# Patient Record
Sex: FEMALE | Race: WHITE | NOT HISPANIC OR LATINO | Employment: UNEMPLOYED | URBAN - METROPOLITAN AREA
[De-identification: names, ages, dates, MRNs, and addresses within clinical notes are randomized per-mention and may not be internally consistent; named-entity substitution may affect disease eponyms.]

---

## 2017-02-07 ENCOUNTER — HOSPITAL ENCOUNTER (OUTPATIENT)
Dept: CARDIOLOGY | Facility: HOSPITAL | Age: 52
Setting detail: RECURRING SERIES
Discharge: HOME | End: 2017-02-07

## 2017-02-07 ENCOUNTER — CLINICAL SUPPORT NO REQUIREMENTS (OUTPATIENT)
Dept: CARDIOLOGY | Facility: CLINIC | Age: 52
End: 2017-02-07

## 2017-02-07 DIAGNOSIS — I49.5 SICK SINUS SYNDROME (HCC): Primary | ICD-10-CM

## 2017-02-07 DIAGNOSIS — I44.2 COMPLETE HEART BLOCK (HCC): ICD-10-CM

## 2017-02-07 PROCEDURE — 36416 COLLJ CAPILLARY BLOOD SPEC: CPT

## 2017-02-07 PROCEDURE — 93288 INTERROG EVL PM/LDLS PM IP: CPT | Performed by: INTERNAL MEDICINE

## 2017-02-07 PROCEDURE — 85610 PROTHROMBIN TIME: CPT

## 2017-02-13 ENCOUNTER — HOSPITAL ENCOUNTER (OUTPATIENT)
Dept: CARDIOLOGY | Facility: HOSPITAL | Age: 52
Setting detail: RECURRING SERIES
Discharge: HOME | End: 2017-02-13

## 2017-02-13 PROCEDURE — 85610 PROTHROMBIN TIME: CPT

## 2017-02-13 PROCEDURE — 36416 COLLJ CAPILLARY BLOOD SPEC: CPT

## 2017-03-16 ENCOUNTER — TELEPHONE (OUTPATIENT)
Dept: CARDIOLOGY | Facility: CLINIC | Age: 52
End: 2017-03-16

## 2017-03-16 ENCOUNTER — HOSPITAL ENCOUNTER (OUTPATIENT)
Dept: CARDIOLOGY | Facility: HOSPITAL | Age: 52
Setting detail: RECURRING SERIES
Discharge: HOME | End: 2017-03-16

## 2017-03-16 PROCEDURE — 85610 PROTHROMBIN TIME: CPT

## 2017-03-16 PROCEDURE — 36416 COLLJ CAPILLARY BLOOD SPEC: CPT

## 2017-03-16 NOTE — TELEPHONE ENCOUNTER
----- Message from Jorden Stockton MD sent at 3/9/2017  4:40 PM EST -----  Regarding: RE:  pt  Give her to [facility] when she joins   ----- Message -----     From: Vidya Hood MA     Sent: 3/9/2017   2:16 PM       To: Jorden Stockton MD  Subject:  pt                                        This pt came to  in November from another cardiologists in [place]. She has a pacemaker. She gets her INR checked here. She has not seen anyone else here. Do you want me to set her up with another provider or do you want to take over here care?......Vidya

## 2017-03-16 NOTE — TELEPHONE ENCOUNTER
[facility] PT------- Marlin called from [facility] because pt is wanting to get a radiation treatment that involves her going into a hyperbaric oxygen tank. They are wanting to know if it is ok since she has a pacemaker. Please advise.....Vidya

## 2017-03-30 ENCOUNTER — TELEPHONE (OUTPATIENT)
Dept: CARDIOLOGY | Facility: CLINIC | Age: 52
End: 2017-03-30

## 2017-03-30 NOTE — TELEPHONE ENCOUNTER
----- Message from Samuel Hood MA sent at 3/27/2017 12:59 PM EDT -----  Regarding: APPT WITH BRUNO  Pt needs a ppt with bruno to get established with another provider before she needs refills. Thanks......samuel

## 2017-03-30 NOTE — TELEPHONE ENCOUNTER
"Called pt to schedule with [facility] next week so she could get her refills.  Pt is very adamant that she will see not see anyone other than a MD.  She is aware Dr Stockton's next appointment is several months out and she states that will not work for her.  I did offer her an appointment to see another MD in the office sooner and she said \"No, you all can't help me\"      As of now, the patient is not scheduled.    Thank you  Abril MESA"

## 2017-03-31 NOTE — TELEPHONE ENCOUNTER
Dr. Stockton what would you like me to do about this. She will need refills on Warfarin soon and she only saw  here and has no other cardiologists outside our office.....Vidya

## 2017-03-31 NOTE — TELEPHONE ENCOUNTER
We can refill her warfarin for another month,  But the deal is we have APRN's here and that is just the way it is.  I will see her w the APRN  Or she can get another doc

## 2018-02-05 ENCOUNTER — TRANSCRIBE ORDERS (OUTPATIENT)
Dept: ADMINISTRATIVE | Facility: HOSPITAL | Age: 53
End: 2018-02-05

## 2018-02-05 DIAGNOSIS — Z00.00 ENCOUNTER FOR PREVENTATIVE ADULT HEALTH CARE EXAMINATION: Primary | ICD-10-CM

## 2018-02-22 ENCOUNTER — HOSPITAL ENCOUNTER (OUTPATIENT)
Dept: MAMMOGRAPHY | Facility: HOSPITAL | Age: 53
Discharge: HOME | End: 2018-02-22
Admitting: FAMILY MEDICINE

## 2018-02-22 DIAGNOSIS — Z00.00 ENCOUNTER FOR PREVENTATIVE ADULT HEALTH CARE EXAMINATION: ICD-10-CM

## 2018-02-22 PROCEDURE — 77067 SCR MAMMO BI INCL CAD: CPT

## 2018-06-18 ENCOUNTER — CONSULT (OUTPATIENT)
Dept: ONCOLOGY | Facility: CLINIC | Age: 53
End: 2018-06-18

## 2018-06-18 ENCOUNTER — LAB (OUTPATIENT)
Dept: OTHER | Facility: HOSPITAL | Age: 53
End: 2018-06-18

## 2018-06-18 VITALS
TEMPERATURE: 97.8 F | DIASTOLIC BLOOD PRESSURE: 71 MMHG | HEART RATE: 63 BPM | BODY MASS INDEX: 22.29 KG/M2 | WEIGHT: 142 LBS | OXYGEN SATURATION: 100 % | SYSTOLIC BLOOD PRESSURE: 109 MMHG | HEIGHT: 67 IN | RESPIRATION RATE: 12 BRPM

## 2018-06-18 DIAGNOSIS — D68.51 FACTOR V LEIDEN (HCC): Primary | ICD-10-CM

## 2018-06-18 DIAGNOSIS — Z79.01 CHRONIC ANTICOAGULATION: ICD-10-CM

## 2018-06-18 DIAGNOSIS — Z86.718 HISTORY OF DEEP VEIN THROMBOSIS (DVT) OF LOWER EXTREMITY: ICD-10-CM

## 2018-06-18 DIAGNOSIS — Z86.711 HISTORY OF PULMONARY EMBOLISM: ICD-10-CM

## 2018-06-18 LAB
BASOPHILS # BLD AUTO: 0.08 10*3/MM3 (ref 0–0.2)
BASOPHILS NFR BLD AUTO: 1 % (ref 0–1.5)
DEPRECATED RDW RBC AUTO: 45.5 FL (ref 37–54)
EOSINOPHIL # BLD AUTO: 0.23 10*3/MM3 (ref 0–0.7)
EOSINOPHIL NFR BLD AUTO: 3 % (ref 0.3–6.2)
ERYTHROCYTE [DISTWIDTH] IN BLOOD BY AUTOMATED COUNT: 12.6 % (ref 11.7–13)
HCT VFR BLD AUTO: 39.8 % (ref 35.6–45.5)
HGB BLD-MCNC: 13.6 G/DL (ref 11.9–15.5)
IMM GRANULOCYTES # BLD: 0.04 10*3/MM3 (ref 0–0.03)
IMM GRANULOCYTES NFR BLD: 0.5 % (ref 0–0.5)
LYMPHOCYTES # BLD AUTO: 2.21 10*3/MM3 (ref 0.9–4.8)
LYMPHOCYTES NFR BLD AUTO: 28.5 % (ref 19.6–45.3)
MCH RBC QN AUTO: 33.3 PG (ref 26.9–32)
MCHC RBC AUTO-ENTMCNC: 34.2 G/DL (ref 32.4–36.3)
MCV RBC AUTO: 97.3 FL (ref 80.5–98.2)
MONOCYTES # BLD AUTO: 0.65 10*3/MM3 (ref 0.2–1.2)
MONOCYTES NFR BLD AUTO: 8.4 % (ref 5–12)
NEUTROPHILS # BLD AUTO: 4.55 10*3/MM3 (ref 1.9–8.1)
NEUTROPHILS NFR BLD AUTO: 58.6 % (ref 42.7–76)
NRBC BLD MANUAL-RTO: 0 /100 WBC (ref 0–0)
PLATELET # BLD AUTO: 200 10*3/MM3 (ref 140–500)
PMV BLD AUTO: 10 FL (ref 6–12)
RBC # BLD AUTO: 4.09 10*6/MM3 (ref 3.9–5.2)
WBC NRBC COR # BLD: 7.76 10*3/MM3 (ref 4.5–10.7)

## 2018-06-18 PROCEDURE — 36415 COLL VENOUS BLD VENIPUNCTURE: CPT

## 2018-06-18 PROCEDURE — 85025 COMPLETE CBC W/AUTO DIFF WBC: CPT | Performed by: INTERNAL MEDICINE

## 2018-06-18 PROCEDURE — 99205 OFFICE O/P NEW HI 60 MIN: CPT | Performed by: INTERNAL MEDICINE

## 2018-06-18 NOTE — PROGRESS NOTES
Subjective     REASON FOR CONSULTATION:  Provide an opinion on any further workup or treatment on:    · Factor V Leiden mutation  · History of Pulmonary embolism in 1998  · History of DVT and pulmonary embolism in 2005                       REQUESTING PHYSICIAN: Peter Jones MD      RECORDS OBTAINED: Records of the patients history including those obtained from the referring provider were reviewed and summarized in detail.    HISTORY OF PRESENT ILLNESS:      Karie Potter is a 52 y.o. patient who was referred for evaluation of anticoagulation options with ongoing treatment for HIV/AIDS.  Patient was diagnosed with pulmonary embolism in 1998  She was found to have homozygous factor V Leiden mutation.  She was placed on anticoagulation with Coumadin. She stayed on anticoagulation until 2005 when she stopped taking the medicine on her own. Shortly after, she developed right lower extremity DVT and 2 pulmonary emboli.  The patient was placed back on Coumadin and stayed on this medicine.    Patient is now following up with Dr. Acosta for her HIV/AIDS. She was recently switched to Genvoya. There is an interaction between this medicine and Coumadin.  Dr. Acosta commended evaluation by hematology for assistance with anticoagulation given that the patient needs ongoing anticoagulation.    Patient reports feeling cold as a result of Coumadin.  She has easy bruisability.  No bleeding at present.             Past Medical History:   Diagnosis Date   • Abnormal blood chemistry    • Allergic rhinitis    • Blood clotting disorder    • Bronchitis    • CAD (coronary artery disease)    • CHF (congestive heart failure)    • Colon cancer    • Contusion of left shoulder    • COPD (chronic obstructive pulmonary disease)    • Crohn's disease    • Depression    • Dyspareunia, female    • Fracture of pubic ramus    • Hip fracture    • HIV disease    • Hypothyroidism    • IBS (irritable bowel syndrome)    • Lumbar strain    • Migraine     • Peripheral neuropathy    • Pilonidal cyst    • Rectal cancer    • STD (female)        Past Surgical History:   Procedure Laterality Date   • LUNG SURGERY     • PACEMAKER IMPLANTATION     • PILONIDAL CYSTECTOMY     • RECTAL SURGERY      destruction of rectal tumor   • THYROIDECTOMY, PARTIAL     • TONSILLECTOMY         Social History     Social History   • Marital status:      Spouse name: N/A   • Number of children: N/A   • Years of education: N/A     Occupational History   • Not on file.     Social History Main Topics   • Smoking status: Current Every Day Smoker   • Smokeless tobacco: Not on file   • Alcohol use Yes   • Drug use: Unknown   • Sexual activity: Not on file     Other Topics Concern   • Not on file     Social History Narrative   • No narrative on file       Cancer-related family history is not on file.    MEDICATIONS:    Current Outpatient Prescriptions:   •  buPROPion XL (WELLBUTRIN XL) 300 MG 24 hr tablet, Take 300 mg by mouth Daily., Disp: , Rfl:   •  Kwyzcmp-Ecfln-Fzhonqdc-TenofAF (GENVOYA) [id number] MG tablet, Take 1 capsule by mouth Daily., Disp: , Rfl:   •  gabapentin (NEURONTIN) 300 MG capsule, Take 300 mg by mouth 2 (Two) Times a Day., Disp: , Rfl:   •  IRON PO, Take 65 mg by mouth Daily., Disp: , Rfl:   •  Multiple Vitamin (MULTI-VITAMIN PO), Take  by mouth Daily., Disp: , Rfl:   •  Multiple Vitamins-Minerals (ZINC PO), Take 50 mg by mouth Daily., Disp: , Rfl:   •  Probiotic Product (PROBIOTIC DAILY PO), Take  by mouth., Disp: , Rfl:   •  topiramate (TOPAMAX) 50 MG tablet, Take 50 mg by mouth 2 (Two) Times a Day., Disp: , Rfl:   •  warfarin (COUMADIN) 5 MG tablet, Take 5 mg by mouth Daily., Disp: , Rfl:      ALLERGIES:  Allergies   Allergen Reactions   • Azithromycin    • Penicillins         REVIEW OF SYSTEMS:  GENERAL:  Feels cold. No weight change.  Fatigue.   SKIN:  No skin rashes or lesions. Itching.  HEME/LYMPH: No Anemia. Easy bruisability.   EYES:  Poor vision.  ENT:   "Sore throat. decreased hearing.  RESPIRATORY:  No Shortness of breath. No Cough. No Hemoptysis.   CVS:  No Chest pain. Ankle swelling. No Palpitations.   GI:  No Abdominal pain. No Nausea. No Vomiting. No Constipation. Diarrhea. No Melena. No Hematochezia.  :  No Hematuria. No Dysuria. Increased frequency.   MUSCULOSKELETAL:  Back pain.   NEUROLOGICAL:  Headaches. Lower extremity Numbness to the level above the knees.      Objective   VITAL SIGNS:  Vitals:    06/18/18 1512   BP: 109/71   Pulse: 63   Resp: 12   Temp: 97.8 °F (36.6 °C)   TempSrc: Oral   SpO2: 100%   Weight: 64.4 kg (142 lb)   Height: 169 cm (66.54\")  Comment: new pt   PainSc:   9   PainLoc: Head       Wt Readings from Last 3 Encounters:   06/18/18 64.4 kg (142 lb)   11/10/16 68 kg (150 lb)   11/01/16 68 kg (150 lb)       PHYSICAL EXAMINATION  GENERAL:  The patient appears in good general condition, not in acute distress.  SKIN:  No skin rashes or lesions. Ecchymosis over the upper and lower extremities.  HEAD:  Normocephalic.  EYES:  No Pallor. No Jaundice.   NECK:  Supple with no Thyromegaly or Masses.  LYMPHATICS:  No cervical or supraclavicular lymphadenopathy.  CHEST:  Lungs clear to auscultation bilaterally. No added sounds.  CARDIAC:  Normal S1 & S2. No Murmurs.  ABDOMEN:  Soft. No tenderness. No Hepatomegaly. No Splenomegaly. No masses.  EXTREMITIES:  No Edema. No Calf tenderness. No Cyanosis.   NEUROLOGICAL:  No Focal neurological deficits.       RESULT REVIEW:     Results from last 7 days  Lab Units 06/18/18  1451   WBC 10*3/mm3 7.76   NEUTROS ABS 10*3/mm3 4.55   HEMOGLOBIN g/dL 13.6   HEMATOCRIT % 39.8   PLATELETS 10*3/mm3 200             Assessment/Plan   1.  Factor V Leiden homozygous mutation. Patient has history of pulmonary embolism around 1998. She Was placed on Coumadin and took it for several years.  However,she stopped anticoagulation on her own I'm developed right lower extremity DVT and 2 pulmonary emboli shortly after coming " off of Coumadin.    2.  The patient has HIV/AIDS and needs ongoing therapy  She was placed on Genvoya which has interactions with Coumadin and it is not recommended for patients to take Coumadin while taking this medicine.    I recommended switching to one of the new direct oral anticoagulants.  When I reviewed interactions between them and Genvoya, There is a significant interaction with Xarelto and it is not recommended to take The 2 medicines together.  Eliquis can be taken but with very close monitoring.  The interaction may result in increased effectiveness of Eliquis.    I had a lengthy discussion with the patient.  She is in need of switching to a new direct oral anticoagulant. I recommended Eliquis at 2.5 mg twice a day.  The discussed the side effects including positivity of bleeding which can be from the gastrointestinal site. The patient wishes to proceed.    PLAN:    1.  Stop Coumadin.  The last dose the patient took was on 6/17/18.    2.  I prescribed Eliquis 2.5 mg every 12 hours to be started on 6/20/2018.    I will see the patient in follow up in 1 week with PT/INR and PTT.         Matthew Frazier MD  06/18/18

## 2018-06-20 ENCOUNTER — TELEPHONE (OUTPATIENT)
Dept: ONCOLOGY | Facility: CLINIC | Age: 53
End: 2018-06-20

## 2018-06-20 NOTE — TELEPHONE ENCOUNTER
1:00pm--received a message to call pt.  Attempted to call pt.  L/m on pt v/m at 1:00pm to call office.

## 2018-06-20 NOTE — TELEPHONE ENCOUNTER
"Pt. At first thought the medication was going to be too expensive, but she found out it was only going to be $20.00.  States \"no worries.\"  "

## 2018-06-23 ENCOUNTER — DOCUMENTATION (OUTPATIENT)
Dept: ONCOLOGY | Facility: CLINIC | Age: 53
End: 2018-06-23

## 2018-06-23 NOTE — PROGRESS NOTES
Patient called reporting a large amount of liquid stool that was dark brown to black.  She did not see any bright red blood.  We told her to hold her Eliquis for now.  She currently denies any weakness or dizziness.  We encouraged her to go to the emergency room to be evaluated but she declined.  She said that if this happens again or if she does start feeling weak or dizzy that she will go the emergency room.

## 2018-06-27 ENCOUNTER — APPOINTMENT (OUTPATIENT)
Dept: OTHER | Facility: HOSPITAL | Age: 53
End: 2018-06-27

## 2018-07-09 ENCOUNTER — APPOINTMENT (OUTPATIENT)
Dept: LAB | Facility: HOSPITAL | Age: 53
End: 2018-07-09

## 2018-07-09 ENCOUNTER — APPOINTMENT (OUTPATIENT)
Dept: ONCOLOGY | Facility: CLINIC | Age: 53
End: 2018-07-09

## 2019-02-13 ENCOUNTER — APPOINTMENT (OUTPATIENT)
Dept: CT IMAGING | Facility: HOSPITAL | Age: 54
End: 2019-02-13

## 2019-02-13 ENCOUNTER — HOSPITAL ENCOUNTER (OUTPATIENT)
Facility: HOSPITAL | Age: 54
Setting detail: OBSERVATION
Discharge: HOME | End: 2019-02-14
Attending: EMERGENCY MEDICINE | Admitting: INTERNAL MEDICINE

## 2019-02-13 ENCOUNTER — APPOINTMENT (OUTPATIENT)
Dept: GENERAL RADIOLOGY | Facility: HOSPITAL | Age: 54
End: 2019-02-13

## 2019-02-13 VITALS
DIASTOLIC BLOOD PRESSURE: 57 MMHG | RESPIRATION RATE: 18 BRPM | TEMPERATURE: 98.2 F | HEIGHT: 66 IN | BODY MASS INDEX: 21.38 KG/M2 | HEART RATE: 60 BPM | WEIGHT: 133 LBS | SYSTOLIC BLOOD PRESSURE: 108 MMHG | OXYGEN SATURATION: 98 %

## 2019-02-13 DIAGNOSIS — R09.02 HYPOXIA: ICD-10-CM

## 2019-02-13 DIAGNOSIS — J18.9 ATYPICAL PNEUMONIA: ICD-10-CM

## 2019-02-13 DIAGNOSIS — J20.9 BRONCHITIS WITH BRONCHOSPASM: Primary | ICD-10-CM

## 2019-02-13 PROBLEM — J44.9 COPD (CHRONIC OBSTRUCTIVE PULMONARY DISEASE) (HCC): Status: ACTIVE | Noted: 2019-02-13

## 2019-02-13 PROBLEM — B20 HIV DISEASE (HCC): Status: ACTIVE | Noted: 2019-02-13

## 2019-02-13 PROBLEM — E03.9 HYPOTHYROIDISM: Status: ACTIVE | Noted: 2019-02-13

## 2019-02-13 LAB
ALBUMIN SERPL-MCNC: 4 G/DL (ref 3.5–5.2)
ALBUMIN/GLOB SERPL: 1.2 G/DL
ALP SERPL-CCNC: 62 U/L (ref 39–117)
ALT SERPL W P-5'-P-CCNC: 34 U/L (ref 1–33)
ANION GAP SERPL CALCULATED.3IONS-SCNC: 9.9 MMOL/L
AST SERPL-CCNC: 30 U/L (ref 1–32)
BILIRUB SERPL-MCNC: 0.3 MG/DL (ref 0.1–1.2)
BUN BLD-MCNC: 15 MG/DL (ref 6–20)
BUN/CREAT SERPL: 12.3 (ref 7–25)
CALCIUM SPEC-SCNC: 12 MG/DL (ref 8.6–10.5)
CHLORIDE SERPL-SCNC: 106 MMOL/L (ref 98–107)
CO2 SERPL-SCNC: 27.1 MMOL/L (ref 22–29)
CREAT BLD-MCNC: 1.22 MG/DL (ref 0.57–1)
D DIMER PPP FEU-MCNC: 0.47 MCGFEU/ML (ref 0–0.49)
DEPRECATED RDW RBC AUTO: 45 FL (ref 37–54)
EOSINOPHIL # BLD MANUAL: 0.4 10*3/MM3 (ref 0–0.4)
EOSINOPHIL NFR BLD MANUAL: 5 % (ref 0.3–6.2)
ERYTHROCYTE [DISTWIDTH] IN BLOOD BY AUTOMATED COUNT: 11.9 % (ref 12.3–15.4)
FLUAV AG NPH QL: NEGATIVE
FLUBV AG NPH QL IA: NEGATIVE
GFR SERPL CREATININE-BSD FRML MDRD: 46 ML/MIN/1.73
GLOBULIN UR ELPH-MCNC: 3.3 GM/DL
GLUCOSE BLD-MCNC: 106 MG/DL (ref 65–99)
HCT VFR BLD AUTO: 38.8 % (ref 34–46.6)
HGB BLD-MCNC: 13.1 G/DL (ref 12–15.9)
INR PPP: 2.38 (ref 0.9–1.1)
LYMPHOCYTES # BLD MANUAL: 2.31 10*3/MM3 (ref 0.7–3.1)
LYMPHOCYTES NFR BLD MANUAL: 29 % (ref 19.6–45.3)
LYMPHOCYTES NFR BLD MANUAL: 5 % (ref 5–12)
MCH RBC QN AUTO: 34.7 PG (ref 26.6–33)
MCHC RBC AUTO-ENTMCNC: 33.8 G/DL (ref 31.5–35.7)
MCV RBC AUTO: 102.6 FL (ref 79–97)
MONOCYTES # BLD AUTO: 0.4 10*3/MM3 (ref 0.1–0.9)
NEUTROPHILS # BLD AUTO: 4.85 10*3/MM3 (ref 1.4–7)
NEUTROPHILS NFR BLD MANUAL: 61 % (ref 42.7–76)
NT-PROBNP SERPL-MCNC: 331.2 PG/ML (ref 5–900)
PLAT MORPH BLD: NORMAL
PLATELET # BLD AUTO: 194 10*3/MM3 (ref 140–450)
PMV BLD AUTO: 9.9 FL (ref 6–12)
POTASSIUM BLD-SCNC: 4 MMOL/L (ref 3.5–5.2)
PROCALCITONIN SERPL-MCNC: 0.12 NG/ML (ref 0.1–0.25)
PROT SERPL-MCNC: 7.3 G/DL (ref 6–8.5)
PROTHROMBIN TIME: 25.6 SECONDS (ref 11.7–14.2)
RBC # BLD AUTO: 3.78 10*6/MM3 (ref 3.77–5.28)
RBC MORPH BLD: NORMAL
SODIUM BLD-SCNC: 143 MMOL/L (ref 136–145)
TROPONIN T SERPL-MCNC: <0.01 NG/ML (ref 0–0.03)
WBC MORPH BLD: NORMAL
WBC NRBC COR # BLD: 7.95 10*3/MM3 (ref 3.4–10.8)

## 2019-02-13 PROCEDURE — 87581 M.PNEUMON DNA AMP PROBE: CPT | Performed by: INTERNAL MEDICINE

## 2019-02-13 PROCEDURE — 93010 ELECTROCARDIOGRAM REPORT: CPT | Performed by: INTERNAL MEDICINE

## 2019-02-13 PROCEDURE — 84145 PROCALCITONIN (PCT): CPT | Performed by: EMERGENCY MEDICINE

## 2019-02-13 PROCEDURE — G0378 HOSPITAL OBSERVATION PER HR: HCPCS

## 2019-02-13 PROCEDURE — 85007 BL SMEAR W/DIFF WBC COUNT: CPT | Performed by: EMERGENCY MEDICINE

## 2019-02-13 PROCEDURE — 94640 AIRWAY INHALATION TREATMENT: CPT

## 2019-02-13 PROCEDURE — 25010000002 CEFTRIAXONE PER 250 MG: Performed by: EMERGENCY MEDICINE

## 2019-02-13 PROCEDURE — 85379 FIBRIN DEGRADATION QUANT: CPT | Performed by: EMERGENCY MEDICINE

## 2019-02-13 PROCEDURE — 87804 INFLUENZA ASSAY W/OPTIC: CPT | Performed by: EMERGENCY MEDICINE

## 2019-02-13 PROCEDURE — 0 IOPAMIDOL PER 1 ML: Performed by: EMERGENCY MEDICINE

## 2019-02-13 PROCEDURE — 36415 COLL VENOUS BLD VENIPUNCTURE: CPT | Performed by: EMERGENCY MEDICINE

## 2019-02-13 PROCEDURE — 93005 ELECTROCARDIOGRAM TRACING: CPT

## 2019-02-13 PROCEDURE — 80053 COMPREHEN METABOLIC PANEL: CPT | Performed by: EMERGENCY MEDICINE

## 2019-02-13 PROCEDURE — 83880 ASSAY OF NATRIURETIC PEPTIDE: CPT | Performed by: EMERGENCY MEDICINE

## 2019-02-13 PROCEDURE — 94799 UNLISTED PULMONARY SVC/PX: CPT

## 2019-02-13 PROCEDURE — 87040 BLOOD CULTURE FOR BACTERIA: CPT | Performed by: EMERGENCY MEDICINE

## 2019-02-13 PROCEDURE — 96376 TX/PRO/DX INJ SAME DRUG ADON: CPT

## 2019-02-13 PROCEDURE — 71275 CT ANGIOGRAPHY CHEST: CPT

## 2019-02-13 PROCEDURE — 25010000002 METHYLPREDNISOLONE PER 125 MG: Performed by: EMERGENCY MEDICINE

## 2019-02-13 PROCEDURE — 84484 ASSAY OF TROPONIN QUANT: CPT | Performed by: EMERGENCY MEDICINE

## 2019-02-13 PROCEDURE — 85610 PROTHROMBIN TIME: CPT | Performed by: EMERGENCY MEDICINE

## 2019-02-13 PROCEDURE — 71046 X-RAY EXAM CHEST 2 VIEWS: CPT

## 2019-02-13 PROCEDURE — 87631 RESP VIRUS 3-5 TARGETS: CPT | Performed by: INTERNAL MEDICINE

## 2019-02-13 PROCEDURE — 96361 HYDRATE IV INFUSION ADD-ON: CPT

## 2019-02-13 PROCEDURE — 93005 ELECTROCARDIOGRAM TRACING: CPT | Performed by: EMERGENCY MEDICINE

## 2019-02-13 PROCEDURE — 99285 EMERGENCY DEPT VISIT HI MDM: CPT

## 2019-02-13 PROCEDURE — 85025 COMPLETE CBC W/AUTO DIFF WBC: CPT | Performed by: EMERGENCY MEDICINE

## 2019-02-13 PROCEDURE — 87486 CHLMYD PNEUM DNA AMP PROBE: CPT | Performed by: INTERNAL MEDICINE

## 2019-02-13 PROCEDURE — 87798 DETECT AGENT NOS DNA AMP: CPT | Performed by: INTERNAL MEDICINE

## 2019-02-13 PROCEDURE — 96374 THER/PROPH/DIAG INJ IV PUSH: CPT

## 2019-02-13 PROCEDURE — 25010000002 METHYLPREDNISOLONE PER 125 MG: Performed by: INTERNAL MEDICINE

## 2019-02-13 RX ORDER — LOPERAMIDE HYDROCHLORIDE 2 MG/1
2 CAPSULE ORAL 4 TIMES DAILY PRN
COMMUNITY

## 2019-02-13 RX ORDER — ACETAMINOPHEN 325 MG/1
650 TABLET ORAL EVERY 4 HOURS PRN
Status: DISCONTINUED | OUTPATIENT
Start: 2019-02-13 | End: 2019-02-14 | Stop reason: HOSPADM

## 2019-02-13 RX ORDER — LEVOFLOXACIN 250 MG/1
500 TABLET ORAL DAILY
Status: DISCONTINUED | OUTPATIENT
Start: 2019-02-13 | End: 2019-02-14

## 2019-02-13 RX ORDER — SODIUM CHLORIDE 0.9 % (FLUSH) 0.9 %
10 SYRINGE (ML) INJECTION AS NEEDED
Status: DISCONTINUED | OUTPATIENT
Start: 2019-02-13 | End: 2019-02-14 | Stop reason: HOSPADM

## 2019-02-13 RX ORDER — METHYLPREDNISOLONE SODIUM SUCCINATE 125 MG/2ML
60 INJECTION, POWDER, LYOPHILIZED, FOR SOLUTION INTRAMUSCULAR; INTRAVENOUS EVERY 6 HOURS
Status: DISCONTINUED | OUTPATIENT
Start: 2019-02-13 | End: 2019-02-14

## 2019-02-13 RX ORDER — BUPROPION HYDROCHLORIDE 300 MG/1
300 TABLET ORAL DAILY
Status: DISCONTINUED | OUTPATIENT
Start: 2019-02-13 | End: 2019-02-14 | Stop reason: HOSPADM

## 2019-02-13 RX ORDER — MULTIPLE VITAMINS W/ MINERALS TAB 9MG-400MCG
1 TAB ORAL DAILY
Status: DISCONTINUED | OUTPATIENT
Start: 2019-02-13 | End: 2019-02-14 | Stop reason: HOSPADM

## 2019-02-13 RX ORDER — WARFARIN SODIUM 10 MG/1
10 TABLET ORAL
Status: DISCONTINUED | OUTPATIENT
Start: 2019-02-16 | End: 2019-02-14 | Stop reason: HOSPADM

## 2019-02-13 RX ORDER — GABAPENTIN 300 MG/1
300 CAPSULE ORAL EVERY 12 HOURS SCHEDULED
Status: DISCONTINUED | OUTPATIENT
Start: 2019-02-13 | End: 2019-02-14 | Stop reason: HOSPADM

## 2019-02-13 RX ORDER — WARFARIN SODIUM 10 MG/1
10 TABLET ORAL WEEKLY
Status: DISCONTINUED | OUTPATIENT
Start: 2019-02-13 | End: 2019-02-13

## 2019-02-13 RX ORDER — ALBUTEROL SULFATE 2.5 MG/3ML
2.5 SOLUTION RESPIRATORY (INHALATION)
Status: DISPENSED | OUTPATIENT
Start: 2019-02-13 | End: 2019-02-14

## 2019-02-13 RX ORDER — WARFARIN SODIUM 5 MG/1
10 TABLET ORAL WEEKLY
COMMUNITY

## 2019-02-13 RX ORDER — WARFARIN SODIUM 5 MG/1
5 TABLET ORAL SEE ADMIN INSTRUCTIONS
COMMUNITY

## 2019-02-13 RX ORDER — WARFARIN SODIUM 5 MG/1
5 TABLET ORAL
Status: DISCONTINUED | OUTPATIENT
Start: 2019-02-14 | End: 2019-02-14 | Stop reason: HOSPADM

## 2019-02-13 RX ORDER — MULTIPLE VITAMINS W/ MINERALS TAB 9MG-400MCG
1 TAB ORAL DAILY
COMMUNITY

## 2019-02-13 RX ORDER — METHYLPREDNISOLONE SODIUM SUCCINATE 125 MG/2ML
125 INJECTION, POWDER, LYOPHILIZED, FOR SOLUTION INTRAMUSCULAR; INTRAVENOUS ONCE
Status: COMPLETED | OUTPATIENT
Start: 2019-02-13 | End: 2019-02-13

## 2019-02-13 RX ORDER — NITROGLYCERIN 0.4 MG/1
0.4 TABLET SUBLINGUAL
Status: DISCONTINUED | OUTPATIENT
Start: 2019-02-13 | End: 2019-02-14 | Stop reason: HOSPADM

## 2019-02-13 RX ORDER — TOPIRAMATE 50 MG/1
50 TABLET, FILM COATED ORAL EVERY 12 HOURS SCHEDULED
Status: DISCONTINUED | OUTPATIENT
Start: 2019-02-13 | End: 2019-02-14 | Stop reason: HOSPADM

## 2019-02-13 RX ORDER — LEVOFLOXACIN 500 MG/1
500 TABLET, FILM COATED ORAL DAILY
COMMUNITY
Start: 2019-02-11 | End: 2019-02-21

## 2019-02-13 RX ORDER — MONTELUKAST SODIUM 10 MG/1
10 TABLET ORAL NIGHTLY
COMMUNITY
End: 2019-02-13

## 2019-02-13 RX ORDER — WARFARIN SODIUM 5 MG/1
5 TABLET ORAL
COMMUNITY
End: 2019-02-13

## 2019-02-13 RX ORDER — ALBUTEROL SULFATE 2.5 MG/3ML
2.5 SOLUTION RESPIRATORY (INHALATION)
Status: COMPLETED | OUTPATIENT
Start: 2019-02-13 | End: 2019-02-13

## 2019-02-13 RX ORDER — SODIUM CHLORIDE 0.9 % (FLUSH) 0.9 %
3 SYRINGE (ML) INJECTION EVERY 12 HOURS SCHEDULED
Status: DISCONTINUED | OUTPATIENT
Start: 2019-02-13 | End: 2019-02-14 | Stop reason: HOSPADM

## 2019-02-13 RX ORDER — SODIUM CHLORIDE 9 MG/ML
100 INJECTION, SOLUTION INTRAVENOUS CONTINUOUS
Status: DISCONTINUED | OUTPATIENT
Start: 2019-02-13 | End: 2019-02-14

## 2019-02-13 RX ORDER — ONDANSETRON 2 MG/ML
4 INJECTION INTRAMUSCULAR; INTRAVENOUS EVERY 6 HOURS PRN
Status: DISCONTINUED | OUTPATIENT
Start: 2019-02-13 | End: 2019-02-14 | Stop reason: HOSPADM

## 2019-02-13 RX ORDER — SODIUM CHLORIDE 0.9 % (FLUSH) 0.9 %
3-10 SYRINGE (ML) INJECTION AS NEEDED
Status: DISCONTINUED | OUTPATIENT
Start: 2019-02-13 | End: 2019-02-14 | Stop reason: HOSPADM

## 2019-02-13 RX ORDER — L.ACID,PARA/B.BIFIDUM/S.THERM 8B CELL
1 CAPSULE ORAL DAILY
Status: DISCONTINUED | OUTPATIENT
Start: 2019-02-13 | End: 2019-02-14 | Stop reason: HOSPADM

## 2019-02-13 RX ORDER — WARFARIN SODIUM 5 MG/1
5 TABLET ORAL SEE ADMIN INSTRUCTIONS
Status: DISCONTINUED | OUTPATIENT
Start: 2019-02-13 | End: 2019-02-13

## 2019-02-13 RX ORDER — CEFTRIAXONE SODIUM 1 G/50ML
1 INJECTION, SOLUTION INTRAVENOUS ONCE
Status: COMPLETED | OUTPATIENT
Start: 2019-02-13 | End: 2019-02-13

## 2019-02-13 RX ORDER — ZINC OXIDE 13 %
1 CREAM (GRAM) TOPICAL DAILY
COMMUNITY

## 2019-02-13 RX ORDER — LOPERAMIDE HYDROCHLORIDE 2 MG/1
2 CAPSULE ORAL 4 TIMES DAILY PRN
Status: DISCONTINUED | OUTPATIENT
Start: 2019-02-13 | End: 2019-02-14 | Stop reason: HOSPADM

## 2019-02-13 RX ADMIN — IOPAMIDOL 95 ML: 755 INJECTION, SOLUTION INTRAVENOUS at 17:13

## 2019-02-13 RX ADMIN — SODIUM CHLORIDE, PRESERVATIVE FREE 3 ML: 5 INJECTION INTRAVENOUS at 20:17

## 2019-02-13 RX ADMIN — METHYLPREDNISOLONE SODIUM SUCCINATE 60 MG: 125 INJECTION, POWDER, FOR SOLUTION INTRAMUSCULAR; INTRAVENOUS at 20:16

## 2019-02-13 RX ADMIN — ALBUTEROL SULFATE 2.5 MG: 2.5 SOLUTION RESPIRATORY (INHALATION) at 15:18

## 2019-02-13 RX ADMIN — TOPIRAMATE 50 MG: 50 TABLET, FILM COATED ORAL at 20:16

## 2019-02-13 RX ADMIN — ALBUTEROL SULFATE 2.5 MG: 2.5 SOLUTION RESPIRATORY (INHALATION) at 21:28

## 2019-02-13 RX ADMIN — ALBUTEROL SULFATE 2.5 MG: 2.5 SOLUTION RESPIRATORY (INHALATION) at 15:14

## 2019-02-13 RX ADMIN — GABAPENTIN 300 MG: 300 CAPSULE ORAL at 20:16

## 2019-02-13 RX ADMIN — SODIUM CHLORIDE 100 ML/HR: 9 INJECTION, SOLUTION INTRAVENOUS at 18:18

## 2019-02-13 RX ADMIN — CEFTRIAXONE SODIUM 1 G: 1 INJECTION, SOLUTION INTRAVENOUS at 20:14

## 2019-02-13 RX ADMIN — METHYLPREDNISOLONE SODIUM SUCCINATE 125 MG: 125 INJECTION, POWDER, FOR SOLUTION INTRAMUSCULAR; INTRAVENOUS at 14:53

## 2019-02-13 RX ADMIN — DOXYCYCLINE 100 MG: 100 INJECTION, POWDER, LYOPHILIZED, FOR SOLUTION INTRAVENOUS at 20:15

## 2019-02-13 NOTE — H&P
Internal medicine history and physical  INTERNAL MEDICINE   [facility]       Patient Identification:  Name: Karie Potter  Age: 53 y.o.  Sex: female  :  1965  MRN: [id number]                   Primary Care Physician: Provider, No Known                                   Chief Complaint: Increasing cough congestion and dyspnea on exertion worsening over the course of last 5-6 days.    History of Present Illness:   Patient is a difficult historian and it requires lots of effort to get the history and proper sequence.  Patient digresses during conversation and states disparaging remarks about her primary care physician who she saw 2 days ago and claims that she is in the process of looking for a new one.  Patient is a 53-year-old female with history of nonischemic cardiomyopathy, history of cardiac pacemaker for complete heart block, history of factor V Leiden deficiency on chronic anticoagulation therapy as well as history of HIV infection diagnosed 26 years ago currently on this regimen for the last 5 years with inability to recall exact CD4 count but does say that she was told that she has undetectable viral load based on the last lab work was in her usual state of health until a day prior to her visit to her cardiologist on 2019 when she started having some shortness of breath and cold-like symptoms.  According to her symptoms were not serious enough to worry about initially.  On the day of the visit to the cardiologist she was told that her pacemaker which is from [employer] has some sort of advisory and according to the patient after some conversation pacemaker was programmed to a different mode.  Patient says that the next day she started having increasing shortness of breath with activity and has decline in functional capacity.  She denies any fever denies any chills denies any significant cough.  Her major issue is feeling weak and had very limited exercise capacity and get out of  breath while walking few steps.  Upon reviewing the record of reprogramming of her pacemaker on 2/8/2019 it appears that she did complain of chest pressure and had lots of questions about the recall advisory on her [employer] pacemaker.  According to the note it appears that as soon as her patient was programmed to DDIR mode from earlier DDD R her chest pressure went away.  Patient left the office in a very bizarre fashion without receiving literature or had subsequent discussion after the procedure.  3 days later patient called the cardiology office complaining of throbbing and pounding sensation in her right ear.  She was instructed that she might want to seek help for possible right ear issues are her infection as change in the mode of her pacemaker is not related to her ear symptoms.  Apparently patient disagreed with the caller.  3 days later because of her worsening symptoms of dyspnea on exertion and decreased exercise capacity and shortness of breath upon walking along with feeling weak she went to see her primary care physician who she does not like but had no choice since he does have not found a new one yet.  She recalled difficult interaction with Dr. Peter Foster and eventually the visit was concluded with prescription of Levaquin.  Patient has taken Levaquin for 2 days and has not gotten any better.  She called her HIV physician who was not in and talk to the nurse who suggested that she need to go to the emergency room.  Patient came to the emergency room and was evaluated and is being admitted for further care.  Today her major complaint for persistent shortness of breath which gets worse upon exertion and she was also having dizziness upon standing with chills.    Past Medical History:  Past Medical History:   Diagnosis Date   • Abnormal blood chemistry    • Allergic rhinitis    • Anxiety    • Arthritis    • Blood clotting disorder (CMS/[facility])    • Bronchitis    • CAD (coronary artery disease)    • CHF  (congestive heart failure) (CMS/[facility])    • Colon cancer (CMS/H[facility]) 2012   • Complete heart block (CMS/[facility]) 1999    [employer] pacemaker, battery changed in 2009   • Contusion of left shoulder    • COPD (chronic obstructive pulmonary disease) (CMS/[facility])    • Crohn's disease (CMS/HCC)    • Depression    • DVT (deep venous thrombosis) (CMS/[facility]) 2005   • Dyspareunia, female    • Factor V Leiden (CMS/[facility])    • Fracture of pubic ramus (CMS/H[facility])    • Hip fracture (CMS/[facility])    • History of prior pregnancies     X2   • HIV disease (CMS/[facility])    • Hypothyroidism    • IBS (irritable bowel syndrome)    • Lumbar strain    • Migraine    • Peripheral neuropathy    • Pilonidal cyst    • Pulmonary embolus (CMS/[facility]) 1998   • Rectal cancer (CMS/[facility]) 2003   • Skin cancer    • STD (female)      Past Surgical History:  Past Surgical History:   Procedure Laterality Date   • LUNG SURGERY     • PACEMAKER IMPLANTATION  2017   • PILONIDAL CYSTECTOMY     • RECTAL SURGERY      destruction of rectal tumor   • THYROIDECTOMY, PARTIAL     • TONSILLECTOMY        Home Meds:    (Not in a hospital admission)  Current Meds:     Current Facility-Administered Medications:   •  [COMPLETED] Insert peripheral IV, , , Once **AND** sodium chloride 0.9 % flush 10 mL, 10 mL, Intravenous, PRN, Danielito Yu MD    Current Outpatient Medications:   •  buPROPion XL (WELLBUTRIN XL) 300 MG 24 hr tablet, Take 300 mg by mouth Daily., Disp: , Rfl:   •  Wauxclk-Rkmaw-Ilhnlynu-TenofAF (GENVOYA) [id number] MG tablet, Take 1 capsule by mouth Daily., Disp: , Rfl:   •  gabapentin (NEURONTIN) 300 MG capsule, Take 300 mg by mouth 2 (Two) Times a Day., Disp: , Rfl:   •  levoFLOXacin (LEVAQUIN) 500 MG tablet, Take 500 mg by mouth Daily., Disp: , Rfl:   •  loperamide (IMODIUM) 2 MG capsule, Take 2 mg by mouth 4 (Four) Times a Day As Needed for Diarrhea., Disp: , Rfl:   •  Multiple Vitamins-Minerals (MULTIVITAMIN WITH MINERALS) tablet tablet, Take 1 tablet by mouth Daily., Disp: ,  Rfl:   •  Probiotic Product (PROBIOTIC DAILY) capsule, Take 1 capsule by mouth Daily., Disp: , Rfl:   •  topiramate (TOPAMAX) 50 MG tablet, Take 50 mg by mouth 2 (Two) Times a Day., Disp: , Rfl:   •  warfarin (COUMADIN) 5 MG tablet, Take 10 mg by mouth 1 (One) Time Per Week. 10 mg on Saturday and 5 mg all other days, Disp: , Rfl:   •  warfarin (COUMADIN) 5 MG tablet, Take 5 mg by mouth See Admin Instructions. 5 mg every day except on Saturday taking 10 mg, Disp: , Rfl:   Allergies:  Allergies   Allergen Reactions   • Azithromycin    • Erythromycin Itching   • Penicillins    • Sulfamethoxazole-Trimethoprim Rash     Social History:   Social History     Tobacco Use   • Smoking status: Current Every Day Smoker     Packs/day: 1.00     Types: Cigarettes   Substance Use Topics   • Alcohol use: Yes      Family History:  Family History   Problem Relation Age of Onset   • Asthma Mother    • Bleeding Disorder Mother    • Bleeding Disorder Father    • Stroke Father    • Hyperlipidemia Father    • Heart disease Father    • Skin cancer Father    • Alcohol abuse Maternal Grandmother    • Arthritis Maternal Grandfather           Review of Systems  See history of present illness and past medical history.    Constitutional: Positive for chills. Negative for fever.   HENT: Negative for sore throat.    Eyes: Negative.    Respiratory: Positive for shortness of breath. Negative for cough.    Cardiovascular: Negative for chest pain.   Gastrointestinal: Negative for abdominal pain, diarrhea and vomiting.   Genitourinary: Negative for dysuria.   Musculoskeletal: Negative for neck pain.   Skin: Negative for rash.   Neurological: Positive for dizziness. Negative for weakness, numbness and headaches.   Hematological: Negative.    Psychiatric/Behavioral: Negative.    All other systems reviewed and are negative.  Remainder of ROS is negative.      Vitals:   /66   Pulse 61   Temp 98.3 °F (36.8 °C) (Tympanic)   Resp 16   Ht 167.6 cm  "(66\")   Wt 60.3 kg (133 lb)   SpO2 96%   BMI 21.47 kg/m²   I/O: No intake or output data in the 24 hours ending 02/13/19 5254  Exam:  General Appearance:    Alert, cooperative, no distress, appears stated age   Head:    Normocephalic, without obvious abnormality, atraumatic   Eyes:    PERRL, conjunctiva/corneas clear, EOM's intact, both eyes   Ears:    Normal external ear canals, both ears   Nose:   Nares normal, septum midline, mucosa normal, no drainage    or sinus tenderness   Throat:   Lips, tongue, gums normal; oral mucosa pink and moist   Neck:   Supple, symmetrical, trachea midline, no adenopathy;     thyroid:  no enlargement/tenderness/nodules; no carotid    bruit or JVD   Back:     Symmetric, no curvature, ROM normal, no CVA tenderness   Lungs:    Decreased breath sounds at the bases no wheezes rhonchi or crackles heard   Chest Wall:   Left upper chest permanent pacemaker in place    Heart:    Regular rate and rhythm, S1 and S2 normal, no murmur, rub   or gallop   Abdomen:     Soft, non-tender, bowel sounds active all four quadrants,     no masses, no hepatomegaly, no splenomegaly   Extremities:   Extremities normal, atraumatic, no cyanosis, questionable 1+ edema   Pulses:   Pulses palpable in all extremities; symmetric all extremities   Skin:   Skin color normal, Skin is warm and dry,  no rashes or palpable lesions   Neurologic:   CNII-XII intact, motor strength grossly intact, sensation grossly intact to light touch, no focal deficits noted       Data Review:      I reviewed the patient's new clinical results.  Results from last 7 days   Lab Units 02/13/19  1451   WBC 10*3/mm3 7.95   HEMOGLOBIN g/dL 13.1   PLATELETS 10*3/mm3 194     Results from last 7 days   Lab Units 02/13/19  1451   SODIUM mmol/L 143   POTASSIUM mmol/L 4.0   CHLORIDE mmol/L 106   CO2 mmol/L 27.1   BUN mg/dL 15   CREATININE mg/dL 1.22*   CALCIUM mg/dL 12.0*   GLUCOSE mg/dL 106*     ECG 12 Lead   Final Result   HEART RATE= 63  bpm "   RR Interval= 956  ms   KS Interval= 150  ms   P Horizontal Axis= -35  deg   P Front Axis= 93  deg   QRSD Interval= 172  ms   QT Interval= 416  ms   QRS Axis= -72  deg   T Wave Axis= 100  deg   - ABNORMAL ECG -   Atrial-ventricular dual-paced rhythm   NO PRIOR TRACING AVAILABLE FOR COMPARISON   Electronically Signed By: [facility]) ([facility]) 13-Feb-2019 16:38:48   Date and Time of Study: 2019-02-13 14:17:16      Xr Chest 2 View    Result Date: 2/13/2019  No acute process.        Ct Angiogram Chest With Contrast    Result Date: 2/13/2019  1. No evidence for pulmonary thromboembolic disease. 2. Patchy groundglass opacities consistent with atypical, groundglass infiltrates best demonstrated at the right upper lobe though also present in the left upper lobe and right lower lobe.  There is also central bronchial wall thickening. No lobar consolidation or effusion. Follow-up chest CT recommended in 6 months to evaluate for resolution.  Discussed with Dr. Yu in the emergency department 02/13/2019 at 5:30 PM.  Radiation dose reduction techniques were utilized, including automated exposure control and exposure modulation based on body size.  This report was finalized on 2/13/2019 8:19 PM by Dr. Jesus Durán M.D.      Microbiology Results (last 10 days)     Procedure Component Value - Date/Time    Influenza Antigen, Rapid - Swab, Nasopharynx [[id number]]  (Normal) Collected:  02/13/19 1507    Lab Status:  Final result Specimen:  Swab from Nasopharynx Updated:  02/13/19 1529     Influenza A Ag, EIA Negative     Influenza B Ag, EIA Negative              Assessment:  Active Hospital Problems    Diagnosis Date Noted   • **Bronchitis with bronchospasm [J20.9] 02/13/2019   • HIV disease (CMS/[facility]) [B20] 02/13/2019   • COPD (chronic obstructive pulmonary disease) (CMS/[facility]) [J44.9] 02/13/2019   • Hypothyroidism [E03.9] 02/13/2019   • Chronic anticoagulation [Z79.01] 06/18/2018   • History of deep vein thrombosis (DVT)  of lower extremity [Z86.718] 06/18/2018   • History of pulmonary embolism [Z86.711] 06/18/2018   • Factor V Leiden (CMS/[facility]) [D68.51] 06/18/2018       Medical decision making:  Cough congestion and shortness of breath with dyspnea on exertion with no associated orthopnea or PND in the context of cardiomyopathy and recent reprogramming of pacemaker with symptom getting worse since then and no improvement on Levaquin prior to coming to the hospital.  This presentation is concerning for probable viral bronchitis/atypical pneumonia versus exacerbation of underlying congestive heart failure with pulmonary edema versus COPD exacerbation versus possibility of recurrent PE even though her INR is therapeutic.  Plan is to admit the patient, provided with nebulizer treatment, empirically diurese her and continue with antibiotics while awaiting respiratory viral panel.  History of third-degree AV block status post permanent pacemaker placement with recent reprogramming-consult cardiology service to see if there is a contribution of recent change in the mode of pacemaker contributing to her symptoms.  Chronic HIV infection appears to be well compensated on current regimen with unknown CD4 count and viral load-consult her infectious disease physician to see if atypical/opportunistic pneumonia could be a concern.  Doubt that is the case because of her history that she had undetectable viral load and has infection for 26 years and she has done well so far.  Hypothyroidism-continue her thyroid replacement therapy  History of hypercoagulable state with recurrent PE due to underlying factor V Leiden-continue on anticoagulation therapy.      Madeline Elliott MD   2/13/2019  5:18 PM  Much of this encounter note is an electronic transcription/translation of spoken language to printed text. The electronic translation of spoken language may permit erroneous, or at times, nonsensical words or phrases to be inadvertently transcribed; Although I  have reviewed the note for such errors, some may still exist

## 2019-02-13 NOTE — PROGRESS NOTES
Clinical Pharmacy Services: Medication History    Karie Potter is a 53 y.o. female presenting to [facility] for   Chief Complaint   Patient presents with   • Shortness of Breath   • Chest Pain       She  has a past medical history of Abnormal blood chemistry, Allergic rhinitis, Anxiety, Arthritis, Blood clotting disorder (CMS/[facility]ly [facility]), Bronchitis, CAD (coronary artery disease), CHF (congestive heart failure) (CMS/[facility]ly [facility]), Colon cancer (CMS/[facility]) (2012), Complete heart block (CMS/[facility]) (1999), Contusion of left shoulder, COPD (chronic obstructive pulmonary disease) (CMS/[facility]), Crohn's disease (CMS/[facility]), Depression, DVT (deep venous thrombosis) (CMS/[facility]) (2005), Dyspareunia, female, Factor V Leiden (CMS/[facility]), Fracture of pubic ramus (CMS/[facility]), Hip fracture (CMS/[facility]), History of prior pregnancies, HIV disease (CMS/[facility]), Hypothyroidism, IBS (irritable bowel syndrome), Lumbar strain, Migraine, Peripheral neuropathy, Pilonidal cyst, Pulmonary embolus (CMS/[facility]) (1998), Rectal cancer (CMS/[facility]) (2003), Skin cancer, and STD (female).    Allergies as of 02/13/2019 - Reviewed 02/13/2019   Allergen Reaction Noted   • Azithromycin  10/31/2016   • Erythromycin Itching 09/27/2016   • Penicillins  10/31/2016   • Sulfamethoxazole-trimethoprim Rash        Medication information was obtained from: Patient, medication bottles  Pharmacy and Phone Number: [phone]    Prior to Admission Medications     Prescriptions Last Dose Informant Patient Reported? Taking?    buPROPion XL (WELLBUTRIN XL) 300 MG 24 hr tablet  Medication Bottle Yes Yes    Take 300 mg by mouth Daily.    Szbcpwd-Dnzit-Guthakea-TenofAF (GENVOYA) [phone] MG tablet  Self Yes Yes    Take 1 capsule by mouth Daily.    gabapentin (NEURONTIN) 300 MG capsule  Medication Bottle Yes Yes    Take 300 mg by mouth 2 (Two) Times a Day.    levoFLOXacin (LEVAQUIN) 500 MG tablet  Medication Bottle Yes Yes    Take 500 mg by mouth Daily.    loperamide (IMODIUM) 2 MG  capsule  Self Yes Yes    Take 2 mg by mouth 4 (Four) Times a Day As Needed for Diarrhea.    Multiple Vitamins-Minerals (MULTIVITAMIN WITH MINERALS) tablet tablet  Self Yes Yes    Take 1 tablet by mouth Daily.    Probiotic Product (PROBIOTIC DAILY) capsule  Self Yes Yes    Take 1 capsule by mouth Daily.    topiramate (TOPAMAX) 50 MG tablet  Medication Bottle Yes Yes    Take 50 mg by mouth 2 (Two) Times a Day.    warfarin (COUMADIN) 5 MG tablet  Medication Bottle Yes Yes    Take 10 mg by mouth 1 (One) Time Per Week. 10 mg on Saturday and 5 mg all other days    warfarin (COUMADIN) 5 MG tablet  Medication Bottle Yes Yes    Take 5 mg by mouth See Admin Instructions. 5 mg every day except on Saturday taking 10 mg            Medication notes: Removed per patient:  Eliquis, Ferrous Sulfate, Zinc    Added: Imodium    This medication list is complete to the best of my knowledge as of 2/13/2019    Please call if questions.    Colleen Lezama, Medication History Technician  2/13/2019 5:00 PM

## 2019-02-13 NOTE — ED PROVIDER NOTES
EMERGENCY DEPARTMENT ENCOUNTER    CHIEF COMPLAINT  Chief Complaint: SOA  History given by: pt  History limited by: nothing  Room Number: 38/38  PMD: Provider, No Known   Dr. Simon (cardiology)      HPI:  Pt is a 53 y.o. female who presents complaining of constant SOA since 1/8/19 due to bronchitis. SOA worsened by exertion. Pt also c/o of dizziness after standing and chills. Pt was given Levaquin by Dr. Jones two days ago, has not noticed a change in her sx. Pt has hx of colitis and CHF.     Duration:  1 month  Onset: gradual  Timing: constant  Location: lungs  Radiation: none  Quality: n/a  Intensity/Severity: moderatae  Progression: worsening  Associated Symptoms: Pt also c/o of dizziness after standing and chills.  Aggravating Factors: exertion  Alleviating Factors: none  Previous Episodes: none  Treatment before arrival: none    PAST MEDICAL HISTORY  Active Ambulatory Problems     Diagnosis Date Noted   • Factor V Leiden (CMS/[facility]) 06/18/2018   • History of pulmonary embolism 06/18/2018   • History of deep vein thrombosis (DVT) of lower extremity 06/18/2018   • Chronic anticoagulation 06/18/2018     Resolved Ambulatory Problems     Diagnosis Date Noted   • No Resolved Ambulatory Problems     Past Medical History:   Diagnosis Date   • Abnormal blood chemistry    • Allergic rhinitis    • Anxiety    • Arthritis    • Blood clotting disorder (CMS/[facility])    • Bronchitis    • CAD (coronary artery disease)    • CHF (congestive heart failure) (CMS/[facility])    • Colon cancer (CMS/[facility]) 2012   • Complete heart block (CMS/[facility]) 1999   • Contusion of left shoulder    • COPD (chronic obstructive pulmonary disease) (CMS/[facility])    • Crohn's disease (CMS/[facility])    • Depression    • DVT (deep venous thrombosis) (CMS/H[facility]) 2005   • Dyspareunia, female    • Factor V Leiden (CMS/HCC)    • Fracture of pubic ramus (CMS/HCC)    • Hip fracture (CMS/HCC)    • History of prior pregnancies    • HIV disease (CMS/[facility])    • Hypothyroidism    • IBS  (irritable bowel syndrome)    • Lumbar strain    • Migraine    • Peripheral neuropathy    • Pilonidal cyst    • Pulmonary embolus (CMS/[facility]) 1998   • Rectal cancer (CMS/[facility]) 2003   • Skin cancer    • STD (female)        PAST SURGICAL HISTORY  Past Surgical History:   Procedure Laterality Date   • LUNG SURGERY     • PACEMAKER IMPLANTATION  2017   • PILONIDAL CYSTECTOMY     • RECTAL SURGERY      destruction of rectal tumor   • THYROIDECTOMY, PARTIAL     • TONSILLECTOMY         FAMILY HISTORY  Family History   Problem Relation Age of Onset   • Asthma Mother    • Bleeding Disorder Mother    • Bleeding Disorder Father    • Stroke Father    • Hyperlipidemia Father    • Heart disease Father    • Skin cancer Father    • Alcohol abuse Maternal Grandmother    • Arthritis Maternal Grandfather        SOCIAL HISTORY  Social History     Socioeconomic History   • Marital status:      Spouse name: Conner   • Number of children: 0   • Years of education: College   • Highest education level: Not on file   Social Needs   • Financial resource strain: Not on file   • Food insecurity - worry: Not on file   • Food insecurity - inability: Not on file   • Transportation needs - medical: Not on file   • Transportation needs - non-medical: Not on file   Occupational History     Employer: RETIRED   Tobacco Use   • Smoking status: Current Every Day Smoker     Packs/day: 1.00     Types: Cigarettes   Substance and Sexual Activity   • Alcohol use: Yes   • Drug use: No   • Sexual activity: Not on file   Other Topics Concern   • Not on file   Social History Narrative   • Not on file       ALLERGIES  Azithromycin; Erythromycin; Penicillins; and Sulfamethoxazole-trimethoprim    REVIEW OF SYSTEMS  Review of Systems   Constitutional: Positive for chills. Negative for fever.   HENT: Negative for sore throat.    Eyes: Negative.    Respiratory: Positive for shortness of breath. Negative for cough.    Cardiovascular: Negative for chest pain.    Gastrointestinal: Negative for abdominal pain, diarrhea and vomiting.   Genitourinary: Negative for dysuria.   Musculoskeletal: Negative for neck pain.   Skin: Negative for rash.   Neurological: Positive for dizziness. Negative for weakness, numbness and headaches.   Hematological: Negative.    Psychiatric/Behavioral: Negative.    All other systems reviewed and are negative.      PHYSICAL EXAM  ED Triage Vitals [02/13/19 1411]   Temp Heart Rate Resp BP SpO2   98.3 °F (36.8 °C) 69 16 103/66 94 %      Temp src Heart Rate Source Patient Position BP Location FiO2 (%)   Tympanic Monitor -- -- --       Physical Exam   Constitutional: She is oriented to person, place, and time. No distress.   HENT:   Head: Normocephalic and atraumatic.   Eyes: EOM are normal. Pupils are equal, round, and reactive to light.   Neck: Normal range of motion. Neck supple. No JVD present.   Cardiovascular: Normal rate, regular rhythm and normal heart sounds.   Pulmonary/Chest: Effort normal. No respiratory distress. She has decreased breath sounds.   Bilat expitory wheezes   Abdominal: Soft. There is no tenderness. There is no rebound and no guarding.   Musculoskeletal: Normal range of motion. She exhibits edema (mild bilat LLE).   Neurological: She is alert and oriented to person, place, and time. She has normal sensation and normal strength.   Skin: Skin is warm and dry. No rash noted.   Psychiatric: Mood and affect normal.   Nursing note and vitals reviewed.      LAB RESULTS  Lab Results (last 24 hours)     Procedure Component Value Units Date/Time    CBC & Differential [[id number]] Collected:  02/13/19 1451    Specimen:  Blood Updated:  02/13/19 1537    Narrative:       The following orders were created for panel order CBC & Differential.  Procedure                               Abnormality         Status                     ---------                               -----------         ------                     Manual Differential[[id number]]           Normal              Final result               CBC Auto Differential[[id number]]        Abnormal            Final result                 Please view results for these tests on the individual orders.    Comprehensive Metabolic Panel [[id number]]  (Abnormal) Collected:  02/13/19 1451    Specimen:  Blood Updated:  02/13/19 1543     Glucose 106 mg/dL      BUN 15 mg/dL      Creatinine 1.22 mg/dL      Sodium 143 mmol/L      Potassium 4.0 mmol/L      Chloride 106 mmol/L      CO2 27.1 mmol/L      Calcium 12.0 mg/dL      Total Protein 7.3 g/dL      Albumin 4.00 g/dL      ALT (SGPT) 34 U/L      AST (SGOT) 30 U/L      Alkaline Phosphatase 62 U/L      Total Bilirubin 0.3 mg/dL      eGFR Non African Amer 46 mL/min/1.73      Globulin 3.3 gm/dL      A/G Ratio 1.2 g/dL      BUN/Creatinine Ratio 12.3     Anion Gap 9.9 mmol/L     BNP [[id number]]  (Normal) Collected:  02/13/19 1451    Specimen:  Blood Updated:  02/13/19 1536     proBNP 331.2 pg/mL     Narrative:       Among patients with dyspnea, NT-proBNP is highly sensitive for the detection of acute congestive heart failure. In addition NT-proBNP of <300 pg/ml effectively rules out acute congestive heart failure with 99% negative predictive value.    D-dimer, Quantitative [[id number]]  (Normal) Collected:  02/13/19 1451    Specimen:  Blood Updated:  02/13/19 1544     D-Dimer, Quantitative 0.47 MCGFEU/mL     Narrative:       The Stago D-Dimer test used in conjunction with a clinical pretest probability (PTP) assessment model, has been approved by the FDA to rule out the presence of venous thromboembolism (VTE) in outpatients suspected of deep venous thrombosis (DVT) or pulmonary embolism (PE).     Troponin [[id number]]  (Normal) Collected:  02/13/19 1451    Specimen:  Blood Updated:  02/13/19 1538     Troponin T <0.010 ng/mL     Narrative:       Troponin T Reference Range:  <= 0.03 ng/mL-   Negative for AMI  >0.03 ng/mL-     Abnormal for myocardial necrosis.  Clinicians would  "have to utilize clinical acumen, EKG, Troponin and serial changes to determine if it is an Acute Myocardial Infarction or myocardial injury due to an underlying chronic condition.     Procalcitonin [[id number]]  (Normal) Collected:  02/13/19 1451    Specimen:  Blood Updated:  02/13/19 1544     Procalcitonin 0.12 ng/mL     Narrative:       As a Marker for Sepsis (Non-Neonates):   1. <0.5 ng/mL represents a low risk of severe sepsis and/or septic shock.  1. >2 ng/mL represents a high risk of severe sepsis and/or septic shock.    As a Marker for Lower Respiratory Tract Infections that require antibiotic therapy:  PCT on Admission     Antibiotic Therapy             6-12 Hrs later  > 0.5                Strongly Recommended            >0.25 - <0.5         Recommended  0.1 - 0.25           Discouraged                   Remeasure/reassess PCT  <0.1                 Strongly Discouraged          Remeasure/reassess PCT      As 28 day mortality risk marker: \"Change in Procalcitonin Result\" (> 80 % or <=80 %) if Day 0 (or Day 1) and Day 4 values are available. Refer to [email or web address]   Change in PCT <=80 %   A decrease of PCT levels below or equal to 80 % defines a positive change in PCT test result representing a higher risk for 28-day all-cause mortality of patients diagnosed with severe sepsis or septic shock.  Change in PCT > 80 %   A decrease of PCT levels of more than 80 % defines a negative change in PCT result representing a lower risk for 28-day all-cause mortality of patients diagnosed with severe sepsis or septic shock.                CBC Auto Differential [[id number]]  (Abnormal) Collected:  02/13/19 1451    Specimen:  Blood Updated:  02/13/19 1537     WBC 7.95 10*3/mm3      RBC 3.78 10*6/mm3      Hemoglobin 13.1 g/dL      Hematocrit 38.8 %      .6 fL      MCH 34.7 pg      MCHC 33.8 g/dL      RDW 11.9 %      RDW-SD 45.0 fl      MPV 9.9 fL      Platelets 194 10*3/mm3     Manual Differential " [[id number]]  (Normal) Collected:  02/13/19 1451    Specimen:  Blood Updated:  02/13/19 1537     Neutrophil % 61.0 %      Lymphocyte % 29.0 %      Monocyte % 5.0 %      Eosinophil % 5.0 %      Neutrophils Absolute 4.85 10*3/mm3      Lymphocytes Absolute 2.31 10*3/mm3      Monocytes Absolute 0.40 10*3/mm3      Eosinophils Absolute 0.40 10*3/mm3      RBC Morphology Normal     WBC Morphology Normal     Platelet Morphology Normal    Protime-INR [[id number]]  (Abnormal) Collected:  02/13/19 1451    Specimen:  Blood Updated:  02/13/19 1606     Protime 25.6 Seconds      INR 2.38    Influenza Antigen, Rapid - Swab, Nasopharynx [[id number]]  (Normal) Collected:  02/13/19 1507    Specimen:  Swab from Nasopharynx Updated:  02/13/19 1529     Influenza A Ag, EIA Negative     Influenza B Ag, EIA Negative          I ordered the above labs and reviewed the results    RADIOLOGY  XR Chest 2 View   Preliminary Result   No acute process.                  CT Angiogram Chest With Contrast    (Results Pending)        I ordered the above noted radiological studies. Interpreted by radiologist. Reviewed by me in PACS.       PROCEDURES  Procedures     EKG          EKG time: 1417  Rhythm/Rate: Ventricular paced at 63  P waves and DE: nml  QRS, axis: left axis deviation   ST and T waves: nml     Interpreted Contemporaneously by me, independently viewed  unchanged compared to prior 11/1/16      PROGRESS AND CONSULTS     1433- Ordered Proventil, Solu-Medrol, and IVF for sx management. Also ordered XR chest, UA, BNP, D-Dimer, Troponin, and Procalcitonin for further evaluation.    1546- Ordered Protime-INR for further evaluation.    1635- Pt's oxygen level dropped down to 84% on ambulation on room air. On rest, it went back up to 91%, so she was put back on oxygen.    1641- Rechecked pt. Pt is resting comfortably. Notified pt of negative chest XR and negative flu test. Discussed the plan to admit the pt for IV abx and continued treatment. Pt and  family agree with the plan and all questions were addressed.    1643- Placed call out to [facility] and requested tele bed.     1658- Talked to Dr. Elliott ([facility]) who agrees to admit pt to hospital.    1704- Rechecked pt. Pt is resting comfortably.  Discussed the plan to CT her chest. Pt understands and agrees with the plan, all questions answered.    1704- Ordered CTA chest for further evaluation.       MEDICAL DECISION MAKING  Results were reviewed/discussed with the patient and they were also made aware of online access. Pt also made aware that some labs, such as cultures, will not be resulted during ER visit and follow up with PMD is necessary.     MDM  Number of Diagnoses or Management Options  Bronchitis with bronchospasm:   Hypoxia:      Amount and/or Complexity of Data Reviewed  Clinical lab tests: reviewed and ordered (Troponin < 0.010)  Tests in the radiology section of CPT®: reviewed and ordered (XR chest- no acute findings  CTA Chest- shows ground glass infiltrates right upper lobe, and atypical pnemonia)  Tests in the medicine section of CPT®: reviewed and ordered (See EKG procedure note)  Decide to obtain previous medical records or to obtain history from someone other than the patient: yes  Review and summarize past medical records: yes (Pt saw her doctor two days ago, was put on Levaquin.)  Discuss the patient with other providers: yes (Dr. Elliott [facility])           DIAGNOSIS  Final diagnoses:   Bronchitis with bronchospasm   Hypoxia   Atypical pneumonia       DISPOSITION  ADMISSION    Discussed treatment plan and reason for admission with pt/family and admitting physician.  Pt/family voiced understanding of the plan for admission for further testing/treatment as needed.         Latest Documented Vital Signs:  As of 5:31 PM  BP- 95/70 HR- 60 Temp- 98.3 °F (36.8 °C) (Tympanic) O2 sat- 95%    --  Documentation assistance provided by rosanna Fritz for Dr. Yu.  Information recorded by the rosanna was done  at my direction and has been verified and validated by me.     Bessy Fritz  02/13/19 [id number]       Danielito Yu MD  02/13/19 [id number]

## 2019-02-13 NOTE — ED NOTES
Renée MABRY notified to collect blood cultures before starting antibiotics. RN agreed. MD aware.      Abril Raines RN  02/13/19 0199

## 2019-02-13 NOTE — ED TRIAGE NOTES
Ems called for soa. Pt recently diagnosed with bronchitis and was put on levaquin. Pt has had 2 dosed without relief. Pt also reports hx of DVT and is concerned she has a PE.

## 2019-02-13 NOTE — ED NOTES
Nursing report ED to floor  Karie Potter  53 y.o.  female    HPI (triage note):   Chief Complaint   Patient presents with   • Shortness of Breath   • Chest Pain       Admitting doctor:   Madeline Elliott MD    Admitting diagnosis:   The primary encounter diagnosis was Bronchitis with bronchospasm. Diagnoses of Hypoxia and Atypical pneumonia were also pertinent to this visit.    Code status:   Current Code Status     Date Active Code Status Order ID Comments User Context       2/13/2019 17:21 CPR [id number]  Madeline Elliott MD ED       Questions for Current Code Status     Question Answer Comment    Code Status CPR     Medical Interventions (Level of Support Prior to Arrest) Full           Allergies:   Azithromycin; Erythromycin; Penicillins; and Sulfamethoxazole-trimethoprim    Weight:       02/13/19  1411   Weight: 60.3 kg (133 lb)       Most recent vitals:   Vitals:    02/13/19 1523 02/13/19 1615 02/13/19 1708 02/13/19 1727   BP:   95/70    Pulse: 60 61 64 60   Resp: 16      Temp:       TempSrc:       SpO2: 100% 96% 97% 95%   Weight:       Height:           Active LDAs/IV Access:   Lines, Drains & Airways    Active LDAs     Name:   Placement date:   Placement time:   Site:   Days:    Peripheral IV 02/13/19 1452 Right Antecubital   02/13/19    1452    Antecubital   less than 1                Labs (abnormal labs have a star):   Labs Reviewed   COMPREHENSIVE METABOLIC PANEL - Abnormal; Notable for the following components:       Result Value    Glucose 106 (*)     Creatinine 1.22 (*)     Calcium 12.0 (*)     ALT (SGPT) 34 (*)     eGFR Non  Amer 46 (*)     All other components within normal limits   CBC WITH AUTO DIFFERENTIAL - Abnormal; Notable for the following components:    .6 (*)     MCH 34.7 (*)     RDW 11.9 (*)     All other components within normal limits   PROTIME-INR - Abnormal; Notable for the following components:    Protime 25.6 (*)     INR 2.38 (*)     All other components within normal limits  "  INFLUENZA ANTIGEN, RAPID - Normal   BNP (IN-HOUSE) - Normal    Narrative:     Among patients with dyspnea, NT-proBNP is highly sensitive for the detection of acute congestive heart failure. In addition NT-proBNP of <300 pg/ml effectively rules out acute congestive heart failure with 99% negative predictive value.   D-DIMER, QUANTITATIVE - Normal    Narrative:     The Stago D-Dimer test used in conjunction with a clinical pretest probability (PTP) assessment model, has been approved by the FDA to rule out the presence of venous thromboembolism (VTE) in outpatients suspected of deep venous thrombosis (DVT) or pulmonary embolism (PE).    TROPONIN (IN-HOUSE) - Normal    Narrative:     Troponin T Reference Range:  <= 0.03 ng/mL-   Negative for AMI  >0.03 ng/mL-     Abnormal for myocardial necrosis.  Clinicians would have to utilize clinical acumen, EKG, Troponin and serial changes to determine if it is an Acute Myocardial Infarction or myocardial injury due to an underlying chronic condition.    PROCALCITONIN - Normal    Narrative:     As a Marker for Sepsis (Non-Neonates):   1. <0.5 ng/mL represents a low risk of severe sepsis and/or septic shock.  1. >2 ng/mL represents a high risk of severe sepsis and/or septic shock.    As a Marker for Lower Respiratory Tract Infections that require antibiotic therapy:  PCT on Admission     Antibiotic Therapy             6-12 Hrs later  > 0.5                Strongly Recommended            >0.25 - <0.5         Recommended  0.1 - 0.25           Discouraged                   Remeasure/reassess PCT  <0.1                 Strongly Discouraged          Remeasure/reassess PCT      As 28 day mortality risk marker: \"Change in Procalcitonin Result\" (> 80 % or <=80 %) if Day 0 (or Day 1) and Day 4 values are available. Refer to http://www.7k7k.coms-pct-calculator.com/   Change in PCT <=80 %   A decrease of PCT levels below or equal to 80 % defines a positive change in PCT test result representing " a higher risk for 28-day all-cause mortality of patients diagnosed with severe sepsis or septic shock.  Change in PCT > 80 %   A decrease of PCT levels of more than 80 % defines a negative change in PCT result representing a lower risk for 28-day all-cause mortality of patients diagnosed with severe sepsis or septic shock.               MANUAL DIFFERENTIAL - Normal   CBC AND DIFFERENTIAL    Narrative:     The following orders were created for panel order CBC & Differential.  Procedure                               Abnormality         Status                     ---------                               -----------         ------                     Manual Differential[[id number]]          Normal              Final result               CBC Auto Differential[[id number]]        Abnormal            Final result                 Please view results for these tests on the individual orders.       EKG:   ECG 12 Lead   Final Result   HEART RATE= 63  bpm   RR Interval= 956  ms   OH Interval= 150  ms   P Horizontal Axis= -35  deg   P Front Axis= 93  deg   QRSD Interval= 172  ms   QT Interval= 416  ms   QRS Axis= -72  deg   T Wave Axis= 100  deg   - ABNORMAL ECG -   Atrial-ventricular dual-paced rhythm   NO PRIOR TRACING AVAILABLE FOR COMPARISON   Electronically Signed By: Fito KwanMARION) ([facility]) 13-Feb-2019 16:38:48   Date and Time of Study: 2019-02-13 14:17:16          Meds given in ED:   Medications   sodium chloride 0.9 % flush 10 mL (not administered)   cefTRIAXone (ROCEPHIN) IVPB 1 g (not administered)   doxycycline (VIBRAMYCIN) 100 mg/100 mL 0.9% NS MBP (not administered)   albuterol (PROVENTIL) nebulizer solution 0.083% 2.5 mg/3mL (2.5 mg Nebulization Given 2/13/19 0978)   methylPREDNISolone sodium succinate (SOLU-Medrol) injection 125 mg (125 mg Intravenous Given 2/13/19 7953)   iopamidol (ISOVUE-370) 76 % injection 100 mL (95 mL Intravenous Given by Other 2/13/19 1713)       Imaging results:  Xr Chest 2 View    Result  Date: 2/13/2019  No acute process.          Ambulatory status:   - adlib    Social issues:   Social History     Socioeconomic History   • Marital status:      Spouse name: Conner   • Number of children: 0   • Years of education: College   • Highest education level: Not on file   Social Needs   • Financial resource strain: Not on file   • Food insecurity - worry: Not on file   • Food insecurity - inability: Not on file   • Transportation needs - medical: Not on file   • Transportation needs - non-medical: Not on file   Occupational History     Employer: RETIRED   Tobacco Use   • Smoking status: Current Every Day Smoker     Packs/day: 1.00     Types: Cigarettes   Substance and Sexual Activity   • Alcohol use: Yes   • Drug use: No   • Sexual activity: Not on file   Other Topics Concern   • Not on file   Social History Narrative   • Not on file        Abril Raines RN  02/13/19 1673

## 2019-02-14 PROBLEM — N17.9 AKI (ACUTE KIDNEY INJURY) (HCC): Status: ACTIVE | Noted: 2019-02-14

## 2019-02-14 PROBLEM — J21.0 RSV (ACUTE BRONCHIOLITIS DUE TO RESPIRATORY SYNCYTIAL VIRUS): Status: ACTIVE | Noted: 2019-02-14

## 2019-02-14 LAB
ALBUMIN SERPL-MCNC: 3.9 G/DL (ref 3.5–5.2)
ALBUMIN/GLOB SERPL: 1.1 G/DL
ALP SERPL-CCNC: 58 U/L (ref 39–117)
ALT SERPL W P-5'-P-CCNC: 31 U/L (ref 1–33)
ANION GAP SERPL CALCULATED.3IONS-SCNC: 13.6 MMOL/L
AST SERPL-CCNC: 26 U/L (ref 1–32)
B PARAPERT DNA SPEC QL NAA+PROBE: NOT DETECTED
B PERT DNA SPEC QL NAA+PROBE: NOT DETECTED
BASOPHILS # BLD AUTO: 0.01 10*3/MM3 (ref 0–0.2)
BASOPHILS NFR BLD AUTO: 0.1 % (ref 0–1.5)
BILIRUB SERPL-MCNC: 0.2 MG/DL (ref 0.1–1.2)
BUN BLD-MCNC: 16 MG/DL (ref 6–20)
BUN/CREAT SERPL: 17.6 (ref 7–25)
C PNEUM DNA NPH QL NAA+NON-PROBE: NOT DETECTED
CALCIUM SPEC-SCNC: 11.5 MG/DL (ref 8.6–10.5)
CHLORIDE SERPL-SCNC: 105 MMOL/L (ref 98–107)
CHOLEST SERPL-MCNC: 179 MG/DL (ref 0–200)
CO2 SERPL-SCNC: 23.4 MMOL/L (ref 22–29)
CREAT BLD-MCNC: 0.91 MG/DL (ref 0.57–1)
DEPRECATED RDW RBC AUTO: 44.8 FL (ref 37–54)
EOSINOPHIL # BLD AUTO: 0 10*3/MM3 (ref 0–0.4)
EOSINOPHIL NFR BLD AUTO: 0 % (ref 0.3–6.2)
ERYTHROCYTE [DISTWIDTH] IN BLOOD BY AUTOMATED COUNT: 11.9 % (ref 12.3–15.4)
FLUAV H1 2009 PAND RNA NPH QL NAA+PROBE: NOT DETECTED
FLUAV H1 HA GENE NPH QL NAA+PROBE: NOT DETECTED
FLUAV H3 RNA NPH QL NAA+PROBE: NOT DETECTED
FLUAV SUBTYP SPEC NAA+PROBE: NOT DETECTED
FLUBV RNA ISLT QL NAA+PROBE: NOT DETECTED
GFR SERPL CREATININE-BSD FRML MDRD: 65 ML/MIN/1.73
GLOBULIN UR ELPH-MCNC: 3.4 GM/DL
GLUCOSE BLD-MCNC: 167 MG/DL (ref 65–99)
HADV DNA SPEC NAA+PROBE: NOT DETECTED
HCOV 229E RNA SPEC QL NAA+PROBE: NOT DETECTED
HCOV HKU1 RNA SPEC QL NAA+PROBE: NOT DETECTED
HCOV NL63 RNA SPEC QL NAA+PROBE: NOT DETECTED
HCOV OC43 RNA SPEC QL NAA+PROBE: NOT DETECTED
HCT VFR BLD AUTO: 39.5 % (ref 34–46.6)
HDLC SERPL-MCNC: 62 MG/DL (ref 40–60)
HGB BLD-MCNC: 13 G/DL (ref 12–15.9)
HMPV RNA NPH QL NAA+NON-PROBE: NOT DETECTED
HPIV1 RNA SPEC QL NAA+PROBE: NOT DETECTED
HPIV2 RNA SPEC QL NAA+PROBE: NOT DETECTED
HPIV3 RNA NPH QL NAA+PROBE: NOT DETECTED
HPIV4 P GENE NPH QL NAA+PROBE: NOT DETECTED
IMM GRANULOCYTES # BLD AUTO: 0.05 10*3/MM3 (ref 0–0.05)
IMM GRANULOCYTES NFR BLD AUTO: 0.7 % (ref 0–0.5)
INR PPP: 2.36 (ref 0.9–1.1)
LDLC SERPL CALC-MCNC: 106 MG/DL (ref 0–100)
LDLC/HDLC SERPL: 1.7 {RATIO}
LYMPHOCYTES # BLD AUTO: 0.91 10*3/MM3 (ref 0.7–3.1)
LYMPHOCYTES NFR BLD AUTO: 12.5 % (ref 19.6–45.3)
M PNEUMO IGG SER IA-ACNC: NOT DETECTED
MCH RBC QN AUTO: 33.7 PG (ref 26.6–33)
MCHC RBC AUTO-ENTMCNC: 32.9 G/DL (ref 31.5–35.7)
MCV RBC AUTO: 102.3 FL (ref 79–97)
MONOCYTES # BLD AUTO: 0.12 10*3/MM3 (ref 0.1–0.9)
MONOCYTES NFR BLD AUTO: 1.7 % (ref 5–12)
NEUTROPHILS # BLD AUTO: 6.17 10*3/MM3 (ref 1.4–7)
NEUTROPHILS NFR BLD AUTO: 85 % (ref 42.7–76)
NRBC BLD AUTO-RTO: 0.1 /100 WBC (ref 0–0)
PLATELET # BLD AUTO: 196 10*3/MM3 (ref 140–450)
PMV BLD AUTO: 10 FL (ref 6–12)
POTASSIUM BLD-SCNC: 3.9 MMOL/L (ref 3.5–5.2)
PROT SERPL-MCNC: 7.3 G/DL (ref 6–8.5)
PROTHROMBIN TIME: 25.5 SECONDS (ref 11.7–14.2)
RBC # BLD AUTO: 3.86 10*6/MM3 (ref 3.77–5.28)
RHINOVIRUS RNA SPEC NAA+PROBE: NOT DETECTED
RSV RNA NPH QL NAA+NON-PROBE: DETECTED
SODIUM BLD-SCNC: 142 MMOL/L (ref 136–145)
TRIGL SERPL-MCNC: 57 MG/DL (ref 0–150)
TROPONIN T SERPL-MCNC: <0.01 NG/ML (ref 0–0.03)
VLDLC SERPL-MCNC: 11.4 MG/DL (ref 5–40)
WBC NRBC COR # BLD: 7.26 10*3/MM3 (ref 3.4–10.8)

## 2019-02-14 PROCEDURE — 80053 COMPREHEN METABOLIC PANEL: CPT | Performed by: INTERNAL MEDICINE

## 2019-02-14 PROCEDURE — 85025 COMPLETE CBC W/AUTO DIFF WBC: CPT | Performed by: INTERNAL MEDICINE

## 2019-02-14 PROCEDURE — 84484 ASSAY OF TROPONIN QUANT: CPT | Performed by: NURSE PRACTITIONER

## 2019-02-14 PROCEDURE — G0378 HOSPITAL OBSERVATION PER HR: HCPCS

## 2019-02-14 PROCEDURE — 80061 LIPID PANEL: CPT | Performed by: NURSE PRACTITIONER

## 2019-02-14 PROCEDURE — 25010000002 FUROSEMIDE PER 20 MG: Performed by: INTERNAL MEDICINE

## 2019-02-14 PROCEDURE — 85610 PROTHROMBIN TIME: CPT | Performed by: INTERNAL MEDICINE

## 2019-02-14 PROCEDURE — 25010000002 METHYLPREDNISOLONE PER 125 MG: Performed by: INTERNAL MEDICINE

## 2019-02-14 PROCEDURE — 96376 TX/PRO/DX INJ SAME DRUG ADON: CPT

## 2019-02-14 PROCEDURE — 96375 TX/PRO/DX INJ NEW DRUG ADDON: CPT

## 2019-02-14 PROCEDURE — 96361 HYDRATE IV INFUSION ADD-ON: CPT

## 2019-02-14 PROCEDURE — 99214 OFFICE O/P EST MOD 30 MIN: CPT | Performed by: NURSE PRACTITIONER

## 2019-02-14 RX ORDER — FUROSEMIDE 10 MG/ML
40 INJECTION INTRAMUSCULAR; INTRAVENOUS ONCE
Status: COMPLETED | OUTPATIENT
Start: 2019-02-14 | End: 2019-02-14

## 2019-02-14 RX ADMIN — SODIUM CHLORIDE 100 ML/HR: 9 INJECTION, SOLUTION INTRAVENOUS at 03:51

## 2019-02-14 RX ADMIN — BUPROPION HYDROCHLORIDE 300 MG: 300 TABLET, EXTENDED RELEASE ORAL at 09:00

## 2019-02-14 RX ADMIN — SODIUM CHLORIDE, PRESERVATIVE FREE 3 ML: 5 INJECTION INTRAVENOUS at 09:00

## 2019-02-14 RX ADMIN — FUROSEMIDE 40 MG: 10 INJECTION, SOLUTION INTRAMUSCULAR; INTRAVENOUS at 05:04

## 2019-02-14 RX ADMIN — MULTIPLE VITAMINS W/ MINERALS TAB 1 TABLET: TAB at 09:00

## 2019-02-14 RX ADMIN — TOPIRAMATE 50 MG: 50 TABLET, FILM COATED ORAL at 09:00

## 2019-02-14 RX ADMIN — METHYLPREDNISOLONE SODIUM SUCCINATE 60 MG: 125 INJECTION, POWDER, FOR SOLUTION INTRAMUSCULAR; INTRAVENOUS at 00:23

## 2019-02-14 RX ADMIN — METHYLPREDNISOLONE SODIUM SUCCINATE 60 MG: 125 INJECTION, POWDER, FOR SOLUTION INTRAMUSCULAR; INTRAVENOUS at 06:16

## 2019-02-14 NOTE — DISCHARGE SUMMARY
Date of Admission: 2/13/2019  Date of Discharge:  2/14/2019  Primary Care Physician: Provider, No Known     Discharge Diagnosis:  Active Hospital Problems    Diagnosis Date Noted   • **RSV (acute bronchiolitis due to respiratory syncytial virus) [J21.0] 02/14/2019   • STEPHANIE (acute kidney injury) (CMS/HCC) [N17.9] 02/14/2019   • Bronchitis with bronchospasm [J20.9] 02/13/2019   • HIV disease (CMS/HCC) [B20] 02/13/2019   • COPD (chronic obstructive pulmonary disease) (CMS/HCC) [J44.9] 02/13/2019   • Hypothyroidism [E03.9] 02/13/2019   • Chronic anticoagulation [Z79.01] 06/18/2018   • History of deep vein thrombosis (DVT) of lower extremity [Z86.718] 06/18/2018   • History of pulmonary embolism [Z86.711] 06/18/2018   • Factor V Leiden (CMS/HCC) [D68.51] 06/18/2018      Resolved Hospital Problems   No resolved problems to display.       Presenting Problem/History of Present Illness:  Hypoxia [R09.02]  Bronchitis with bronchospasm [J20.9]  Atypical pneumonia [J18.9]  Bronchitis with bronchospasm [J20.9]     Hospital Course:  The patient is a 53 y.o. woman admitted yesterday for cough and shortness of breath.  Please see the admitting history and physical for complete details.  She was given 1 dose of IV Lasix and had 1500 cc of urine output.  She was started on empiric IV steroids, antibiotics and mini nebs.  Respiratory panel positive for RSV.  Antibiotics and steroids will be stopped.  I discussed her care with Dr. Elliott.  Patient is feeling better and is asking about going home.  Await further input by cardiology.  Her device is supposed to be interrogated as well.  Patient has been advised to stop smoking.  She has underlying COPD.  As she is not wheezing, no further need for steroids.  Patient and  asking about testing for HIV viral load.  They will get this done through Dr. Acosta's office next week.  Lab values consistent with acute kidney injury at presentation, now resolved.      Stable condition; good  prognosis    Exam Today:  Minimal cough, nonproductive.  No wheezing.  No dizziness.   at bedside.  Vital signs noted.  No distress.  Heart is regular.  I did not hear a murmur.  Lungs fairly clear.  Few crackles right base.  Extremities no edema.  Abdomen soft and nontender.  No hepatosplenomegaly    Procedures Performed:  Xr Chest 2 View    Result Date: 2/14/2019  Minimal straining and increased density in the right upper lobe and left infrahilar region as described.  This report was finalized on 2/14/2019 7:40 AM by Dr. Aristides Reyna M.D.      Ct Angiogram Chest With Contrast    Result Date: 2/13/2019  1. No evidence for pulmonary thromboembolic disease. 2. Patchy groundglass opacities consistent with atypical, groundglass infiltrates best demonstrated at the right upper lobe though also present in the left upper lobe and right lower lobe.  There is also central bronchial wall thickening. No lobar consolidation or effusion. Follow-up chest CT recommended in 6 months to evaluate for resolution.  Discussed with Dr. Yu in the emergency department 02/13/2019 at 5:30 PM.  Radiation dose reduction techniques were utilized, including automated exposure control and exposure modulation based on body size.  This report was finalized on 2/13/2019 8:19 PM by Dr. Jesus Durán M.D.           Labs:  Lab Results   Component Value Date    WBC 7.26 02/14/2019    HGB 13.0 02/14/2019    HCT 39.5 02/14/2019     02/14/2019     Lab Results   Component Value Date     02/14/2019    K 3.9 02/14/2019     02/14/2019    CO2 23.4 02/14/2019    BUN 16 02/14/2019    CREATININE 0.91 02/14/2019    GLUCOSE 167 (H) 02/14/2019     Lab Results   Component Value Date    AST 26 02/14/2019    ALT 31 02/14/2019    ALKPHOS 58 02/14/2019     Lab Results   Component Value Date    INR 2.36 (H) 02/14/2019     Respiratory viral panel positive for RSV  Troponin T less than 0.01  Triglycerides 57 HDL 62     Results  from last 7 days   Lab Units 02/13/19 1946 02/13/19  1913   BLOODCX  No growth at less than 24 hours No growth at less than 24 hours       Consults:   Dr. Earl Bullock    Discharge Disposition:  Home or Self Care    Discharge Medications:     Discharge Medications      Continue These Medications      Instructions Start Date   gabapentin 300 MG capsule  Commonly known as:  NEURONTIN   300 mg, Oral, 2 Times Daily      GENVOYA [phone] MG per tablet  Generic drug:  Rowigmd-Nhxbk-Kuufxyzy-TenofAF   1 capsule, Oral, Daily      levoFLOXacin 500 MG tablet  Commonly known as:  LEVAQUIN   500 mg, Oral, Daily      loperamide 2 MG capsule  Commonly known as:  IMODIUM   2 mg, Oral, 4 Times Daily PRN      multivitamin with minerals tablet tablet   1 tablet, Oral, Daily      PROBIOTIC DAILY capsule   1 capsule, Oral, Daily      topiramate 50 MG tablet  Commonly known as:  TOPAMAX   50 mg, Oral, 2 Times Daily      warfarin 5 MG tablet  Commonly known as:  COUMADIN   10 mg, Oral, Weekly, 10 mg on Saturday and 5 mg all other days      warfarin 5 MG tablet  Commonly known as:  COUMADIN   5 mg, Oral, See Admin Instructions, 5 mg every day except on Saturday taking 10 mg      WELLBUTRIN  MG 24 hr tablet  Generic drug:  buPROPion XL   300 mg, Oral, Daily             Discharge Diet:   Diet Instructions     Diet: Regular      Discharge Diet:  Regular        Maintain a healthy diet                    Activity at Discharge:   Activity Instructions     Activity as Tolerated      Additional Activity Instructions:      Increase activity as tolerated                    Follow-up Appointments:  No future appointments.  Follow-up Information     Provider, No Known Follow up in 2 week(s).    Why:  RSV, COPD   Contact information:  [facility] [address] [phone]             Nicole Acosta MD. Call in 1 week(s).    Specialty:  Infectious Diseases  Why:  followup for viral load testing and recent RSV  infection   (Office closed on Tuesday and Thursday.)  Contact information:  [address]  [facility] [address] [phone]                     Test Results Pending at Discharge:   Order Current Status    Blood Culture - Blood, Arm, Left Preliminary result    Blood Culture - Blood, Arm, Right Preliminary result           Elsi Gregory MD  02/14/19  1:10 PM    Time Spent on Discharge Activities: 35 minutes.  Discussed with patient and  at length.  Discussed with nurse and CCP.  Discussed with Dr. Elliott.  Discussed with cardiology nurse, Marsha Ron

## 2019-02-14 NOTE — CONSULTS
"  Infectious Diseases Progress Note    Madeline Elliott MD     [facility]  [place]: 1 day  Patient Identification:  Name: Karie Potter  Age: 53 y.o.  Sex: female  :  1965  MRN: [id number]         Primary Care Physician: Provider, No Known            Subjective: Improved significantly compared to last night after multiple interventions were performed last night including nebulizer treatment, a dose of steroid diuretic and this morning pacemaker was reprogrammed to the previous setting resulting in immediate improvement in her sense of well-being and breathing.  Patient denies any fever and chills.  And has preserved appetite.  Interval History: See admission history and physical [facility] coverage for internal medicine service.    Objective:    Scheduled Meds:  buPROPion  mg Oral Daily   Pbcilny-Cfdmm-Ubmahmbu-TenofAF 1 tablet Oral Daily   gabapentin 300 mg Oral Q12H   lactobacillus acidophilus 1 capsule Oral Daily   multivitamin with minerals 1 tablet Oral Daily   sodium chloride 3 mL Intravenous Q12H   topiramate 50 mg Oral Q12H   [START ON 2019] warfarin 10 mg Oral Once per day on Sat   warfarin 5 mg Oral Once per day on Sun      Continuous Infusions:     Vital signs in last 24 hours:  Temp:  [98.2 °F (36.8 °C)-98.9 °F (37.2 °C)] 98.2 °F (36.8 °C)  Heart Rate:  [60-69] 60  Resp:  [16-18] 18  BP: ()/(53-74) 108/57    Intake/Output:    Intake/Output Summary (Last 24 hours) at 2019 1228  Last data filed at 2019 0351  Gross per 24 hour   Intake 1950 ml   Output 400 ml   Net 1550 ml       Exam:  /57 (BP Location: Left arm, Patient Position: Lying)   Pulse 60   Temp 98.2 °F (36.8 °C) (Oral)   Resp 18   Ht 167.6 cm (66\")   Wt 60.3 kg (133 lb)   SpO2 98%   BMI 21.47 kg/m²     General Appearance:    Alert, cooperative, no distress, AAOx3                          Head:    Normocephalic, without obvious abnormality, atraumatic                       "     Eyes:    PERRL, conjunctivae/corneas clear, EOM's intact, both eyes                         Throat:   Lips, tongue, gums normal; oral mucosa pink and moist                           Neck:   Supple, symmetrical, trachea midline, no JVD                         Lungs:    Improved air entry                 Chest Wall:    No tenderness or deformity                          Heart:    Regular rate and rhythm, S1 and S2 normal, no murmur,                  Abdomen:     Soft, non-tender, bowel sounds active, no masses, no                                                        organomegaly                  Extremities:   Extremities normal, atraumatic, no cyanosis or edema                        Pulses:   Pulses palpable in all extremities                            Skin:   Skin is warm and dry,  no rashes or palpable lesions             Data Review:    I reviewed the patient's new clinical results.  Results from last 7 days   Lab Units 02/14/19  0719 02/13/19  1451   WBC 10*3/mm3 7.26 7.95   HEMOGLOBIN g/dL 13.0 13.1   PLATELETS 10*3/mm3 196 194     Results from last 7 days   Lab Units 02/14/19  0719 02/13/19  1451   SODIUM mmol/L 142 143   POTASSIUM mmol/L 3.9 4.0   CHLORIDE mmol/L 105 106   CO2 mmol/L 23.4 27.1   BUN mg/dL 16 15   CREATININE mg/dL 0.91 1.22*   CALCIUM mg/dL 11.5* 12.0*   GLUCOSE mg/dL 167* 106*     Microbiology Results (last 10 days)     Procedure Component Value - Date/Time    Blood Culture - Blood, Arm, Left [[id number]] Collected:  02/13/19 1946    Lab Status:  Preliminary result Specimen:  Blood from Arm, Left Updated:  02/14/19 0845     Blood Culture No growth at less than 24 hours    Blood Culture - Blood, Arm, Right [[id number]] Collected:  02/13/19 1913    Lab Status:  Preliminary result Specimen:  Blood from Arm, Right Updated:  02/14/19 0745     Blood Culture No growth at less than 24 hours    Influenza Antigen, Rapid - Swab, Nasopharynx [[id number]]  (Normal) Collected:  02/13/19 1507    Lab  Status:  Final result Specimen:  Swab from Nasopharynx Updated:  02/13/19 1529     Influenza A Ag, EIA Negative     Influenza B Ag, EIA Negative    Respiratory Panel, PCR - Swab, Nasopharynx [[id number]]  (Abnormal) Collected:  02/13/19 1507    Lab Status:  Final result Specimen:  Swab from Nasopharynx Updated:  02/14/19 0706     ADENOVIRUS, PCR Not Detected     Coronavirus 229E Not Detected     Coronavirus HKU1 Not Detected     Coronavirus NL63 Not Detected     Coronavirus OC43 Not Detected     Human Metapneumovirus Not Detected     Human Rhinovirus/Enterovirus Not Detected     Influenza B PCR Not Detected     Parainfluenza Virus 1 Not Detected     Parainfluenza Virus 2 Not Detected     Parainfluenza Virus 3 Not Detected     Parainfluenza Virus 4 Not Detected     Bordetella pertussis pcr Not Detected     Influenza A H1 2009 PCR Not Detected     Chlamydophila pneumoniae PCR Not Detected     Mycoplasma pneumo by PCR Not Detected     Influenza A PCR Not Detected     Influenza A H3 Not Detected     Influenza A H1 Not Detected     RSV, PCR Detected     Bordetella parapertussis PCR Not Detected      Xr Chest 2 View    Result Date: 2/14/2019  Minimal straining and increased density in the right upper lobe and left infrahilar region as described.  This report was finalized on 2/14/2019 7:40 AM by Dr. Aristides Reyna M.D.      Ct Angiogram Chest With Contrast    Result Date: 2/13/2019  1. No evidence for pulmonary thromboembolic disease. 2. Patchy groundglass opacities consistent with atypical, groundglass infiltrates best demonstrated at the right upper lobe though also present in the left upper lobe and right lower lobe.  There is also central bronchial wall thickening. No lobar consolidation or effusion. Follow-up chest CT recommended in 6 months to evaluate for resolution.  Discussed with Dr. Yu in the emergency department 02/13/2019 at 5:30 PM.  Radiation dose reduction techniques were utilized, including  automated exposure control and exposure modulation based on body size.  This report was finalized on 2/13/2019 8:19 PM by Dr. Jesus Durán M.D.            Assessment:    RSV (acute bronchiolitis due to respiratory syncytial virus)    Factor V Leiden (CMS/[facility])    History of pulmonary embolism    History of deep vein thrombosis (DVT) of lower extremity    Chronic anticoagulation    Bronchitis with bronchospasm    HIV disease (CMS/[facility])    COPD (chronic obstructive pulmonary disease) (CMS/[facility])    Hypothyroidism      Recommendations:  Her presentation can be explained by underlying RSV bronchitis exacerbated by recent adjustment and reprogramming of her pacemaker setting causing her to have tachycardia induced flash pulmonary edema making her short of breath with exertion and the feeling of constant air hunger.  She is dramatically improved and can be discharged on no antibiotic therapy once cleared by cardiology service.  Discussed with Dr. Gregory.    Madeline Elliott MD  2/14/2019  12:28 PM    Much of this encounter note is an electronic transcription/translation of spoken language to printed text. The electronic translation of spoken language may permit erroneous, or at times, nonsensical words or phrases to be inadvertently transcribed; Although I have reviewed the note for such errors, some may still exist

## 2019-02-14 NOTE — NURSING NOTE
This  Nurse contacted Dr. Mcclain regarding the results of the respiratory panel. Dr. Mcclain stated that she is out of town and regarding RSV the other doctors can handle it. Dr. Mcclain stated that this patient is very compliant with her care.

## 2019-02-14 NOTE — NURSING NOTE
[employer] stopped by and did interrogation on the patient's pacemaker. Documentation is in the bedside chart. Will continue to monitor.

## 2019-02-14 NOTE — PROGRESS NOTES
Continued Stay Note  [facility]     Patient Name: Karie Potter  MRN: [id number]  Today's Date: 2/14/2019    Admit Date: 2/13/2019    Discharge Plan     Row Name 02/14/19 1506       Plan    Plan  Return home with     Patient/Family in Agreement with Plan  yes    Plan Comments  Spoke with admitting MD and she requests patient be given list of [facility] physicians.  Patient was given list with contact information for liason. BHumeniuk RN       Expected Discharge Date and Time     Expected Discharge Date Expected Discharge Time    Feb 14, 2019             Becky S. Humeniuk, RN

## 2019-02-14 NOTE — DISCHARGE INSTR - APPOINTMENTS
Please keep all of your health care providers appointments    Please follow up with Dr. Simon at [facility] within a week.

## 2019-02-14 NOTE — CONSULTS
Adult Nutrition  Assessment/PES    Patient Name:  Karie Potter  YOB: 1965  MRN: [id number]  Admit Date:  2/13/2019    Assessment Date:  2/14/2019    Comments:  Nutrition assessment complete from database referral. Pt reports good appetite and no weight loss. No need for RD at this time.    Reason for Assessment     Row Name 02/14/19 1235          Reason for Assessment    Reason For Assessment  identified at risk by screening criteria;nurse/nurse practitioner consult     Diagnosis  pulmonary disease bronchiolitis, RSV, Hx HIV      Identified At Risk by Screening Criteria  MST SCORE 2+         Nutrition/Diet History     Row Name 02/14/19 1244          Nutrition/Diet History    Typical Food/Fluid Intake  good appetite, no recent weight loss         Anthropometrics     Row Name 02/14/19 1236          Admit Weight    Admit Weight  60.3 kg (133 lb)        Body Mass Index (BMI)    BMI Assessment  BMI 18.5-24.9: normal         Labs/Tests/Procedures/Meds     Row Name 02/14/19 1236          Labs/Procedures/Meds    Lab Results Reviewed  reviewed, pertinent     Lab Results Comments  glu, INR        Diagnostic Tests/Procedures    Diagnostic Test/Procedure Reviewed  reviewed, pertinent        Medications    Pertinent Medications Reviewed  reviewed, pertinent     Pertinent Medications Comments  coumadin, probiotic         Physical Findings     Row Name 02/14/19 1239          Physical Findings    Skin  other (see comments) intact, B=21           Nutrition Prescription Ordered     Row Name 02/14/19 1240          Nutrition Prescription PO    Current PO Diet  Regular         Evaluation of Received Nutrient/Fluid Intake     Row Name 02/14/19 1241          PO Evaluation    Number of Days PO Intake Evaluated  Insufficient Data               Problem/Interventions:  Problem 1     Row Name 02/14/19 1244          Nutrition Diagnoses Problem 1    Problem 1  Nutrition Appropriate for Condition at this Time         Intervention  Goal     Row Name 02/14/19 1244          Intervention Goal    General  Maintain nutrition;Disease management/therapy     PO  Tolerate PO;Maintain intake     Weight  Maintain weight         Nutrition Intervention     Row Name 02/14/19 1244          Nutrition Intervention    RD/Tech Action  Care plan reviewd;Encourage intake;Follow Tx progress           Education/Evaluation     Row Name 02/14/19 1244          Education    Education  Will Instruct as appropriate        Monitor/Evaluation    Monitor  Per protocol     Education Follow-up  Reinforce PRN           Electronically signed by:  Sandra Grajeda RD  02/14/19 12:45 PM

## 2019-02-14 NOTE — PROGRESS NOTES
Discharge Planning Assessment  [facility]     Patient Name: Karie Potter  MRN: [id number]  Today's Date: 2/14/2019    Admit Date: 2/13/2019    Discharge Needs Assessment     Row Name 02/14/19 1004       Living Environment    Lives With  spouse    Current Living Arrangements  home/apartment/condo    Primary Care Provided by  self    Provides Primary Care For  no one    Family Caregiver if Needed  spouse    Family Caregiver Names   Conner [phone]    Quality of Family Relationships  helpful    Able to Return to Prior Arrangements  yes       Resource/Environmental Concerns    Resource/Environmental Concerns  none       Transition Planning    Patient/Family Anticipates Transition to  home with family    Patient/Family Anticipated Services at Transition  none    Transportation Anticipated  family or friend will provide       Discharge Needs Assessment    Readmission Within the Last 30 Days  no previous admission in last 30 days    Concerns to be Addressed  no discharge needs identified    Equipment Currently Used at Home  none    Anticipated Changes Related to Illness  none    Equipment Needed After Discharge  none        Discharge Plan     Row Name 02/14/19 1005       Plan    Plan  Return home with     Patient/Family in Agreement with Plan  yes    Plan Comments  Spoke with patient at bedside.  Patient lives with  Conner [phone], is IADL, uses no DME.  She has used HH in the past and has never been to SNF.  Patient states she wants a new PCP - offered to give contact information for [facility] liason and list of [facility] physicians.  Patient states she does not want a list, that she wants a recommendation of someone that will be very concerned and caring with her problems.  Explained that CCP cannot recommend, patient did not want [facility] list.  Patient plans to return home at [place].  CCP will follow.  BHumeniuk RN          Demographic Summary     Row Name 02/14/19 1001       General Information     Admission Type  inpatient    Arrived From  home    Referral Source  admission list    Reason for Consult  discharge planning    Preferred Language  English     Used During This Interaction  no        Functional Status     Row Name 02/14/19 1004       Functional Status    Usual Activity Tolerance  moderate    Current Activity Tolerance  moderate       Functional Status, IADL    Medications  independent    Meal Preparation  independent    Housekeeping  independent    Laundry  independent    Shopping  independent;assistive person       Mental Status    General Appearance WDL  WDL       Mental Status Summary    Recent Changes in Mental Status/Cognitive Functioning  no changes              Becky S. Humeniuk, RN

## 2019-02-14 NOTE — NURSING NOTE
Per Nursing Communication, this nurse contacted [employer] for pacemaker interrogation. Requested for the  Procedure be done today. This nurse verbally confirmed with SWATHI Hinton Will continue to monitor.

## 2019-02-14 NOTE — CONSULTS
"[facility]  Cardiology Consult Note    Patient Identification:  Name: Karie Potter  Age: 53 y.o.  Sex: female  :  1965  MRN: [id number]             Requesting Physician:  Dr. Elliott    Reason for Consultation / Chief Complaint:  \"?Pacemaker malfunction\"    History of Present Illness:       Karie Potter is a 53 yr/o female who is followed by Dr. Simon at [facility] and was last seen 18 for follow up on non ischemic cardiomyopathy with complete heart block  and pacemaker placement in  with replacement in 2017 of IRENE per care everywhere records.  She had previously been followed by Dr. rios in  but after his departure from [facility] went to [facility] for her cardiac care with Dr. Simon.  She also has a history of factor V Leiden deficiency and is on chronic anticoagulation with warfarin.  He has a history of HIV infection diagnosed 26 years ago and is followed by ID  as outpt with patient reported undetectable viral loads.   She presented to [facility] ED with shortness of breath that started 19.  She was diagnosed and treated for bronchitis and was started on Levaquin as outpatient with no improvement in her symptoms.  Respiratory viral panel on admit tested + for RSV with minimal straining and increased density in the right upper lobe and left infrahilar region which could represent PNA.      We have been asked to consult by Internal Medicine for possible pacemaker malfunction. Device interrogation from [facility] visit 2019 revealed DDIR pacer with 0% Afib burdern. Pt was recently reprogrammed to what appears to be DVIR pacing due to issues what she reports was issue with her pacing function and alert noted on remote testing the month prior.  Prior to that 18 when she saw Dr. Simon her ECG tracing showed sinus rhythm and ventricular paced. PPM Device interrogation  At that time showed stable lead sensing, impedance and capture thresholds " "and there were 0 mode switches. She was pacing  <1% of the time in the A and 100% of the time in the V.    She reports that she had been experiencing issues with shortness of breath around the same time that the pacemaker device was reprogrammed to the point that she was unable to perform activities of daily living without significant dyspnea on exertion and weakness.  She states after the reprogramming she noted having audible pulsations in her left ear, faster and more forcefull heart beat, nasal congestion, cough, orthopnea, chest pain, and dyspnea on exertion.  She denies any syncope or near syncope or palpitations.  She states at the time of the pacemaker reprogramming she had multiple questions that the pacemaker clinic staff was unable to answer and became frustrated and left the visit before receiving discharge instructions.  She was told by pacemaker clinic staff that the reprogramming was necessary in order to prevent \"death\".  She became very anxious about this and requested that the physician contact her so that she could inquire further about the device reprogramming.  She is very worried about her pacemaker being under recall per her report and she is worried that it will stop functioning and could lead to death.  She is quite anxious about this. And wants device removed and a [employer] device implanted in its place.    I inquired regarding her chest pain.  She currently feels better \"now that the steroids have kicked in\".  She states her cough is improved and that the chest pain was not always associated with the cough.  She noted chest pain over pacemaker site and radiated through to her back at rest and with activity.  She is concerned that the pacemaker is sticking out more than her previous pacemaker.  She did state that she has had some weight loss since her previous device change out.  The issues with erosion at site she states the device is quite uncomfortable when she lies on her left side " causing some discomfort which has been there since implant fall of last year.    Comorbid cardiac risk factors: CAD, tobacco use, family hx of CAD/ stents father late 50-60's.  and HIV.      Past Medical History:  Past Medical History:   Diagnosis Date   • Abnormal blood chemistry    • Allergic rhinitis    • Anxiety    • Arthritis    • Blood clotting disorder (CMS/[facility])    • Bronchitis    • CAD (coronary artery disease)    • CHF (congestive heart failure) (CMS/[facility])    • Colon cancer (CMS/[facility]) 2012   • Complete heart block (CMS/[facility]) 1999    [employer] pacemaker, battery changed in 2009   • Contusion of left shoulder    • COPD (chronic obstructive pulmonary disease) (CMS/[facility])    • Crohn's disease (CMS/[facility])    • Depression    • DVT (deep venous thrombosis) (CMS/[facility]) 2005   • Dyspareunia, female    • Factor V Leiden (CMS/[facility])    • Fracture of pubic ramus (CMS/[facility])    • Hip fracture (CMS/[facility])    • History of prior pregnancies     X2   • HIV disease (CMS/[facility])    • Hypothyroidism    • IBS (irritable bowel syndrome)    • Lumbar strain    • Migraine    • Peripheral neuropathy    • Pilonidal cyst    • Pulmonary embolus (CMS/[facility]) 1998   • Rectal cancer (CMS/[facility]) 2003   • Skin cancer    • STD (female)      Past Surgical History:  Past Surgical History:   Procedure Laterality Date   • LUNG SURGERY     • PACEMAKER IMPLANTATION  2017   • PILONIDAL CYSTECTOMY     • RECTAL SURGERY      destruction of rectal tumor   • THYROIDECTOMY, PARTIAL     • TONSILLECTOMY        Allergies:  Allergies   Allergen Reactions   • Azithromycin    • Erythromycin Itching   • Penicillins    • Sulfamethoxazole-Trimethoprim Rash     Home Meds:  Medications Prior to Admission   Medication Sig Dispense Refill Last Dose   • buPROPion XL (WELLBUTRIN XL) 300 MG 24 hr tablet Take 300 mg by mouth Daily.   Taking   • Klaruyc-Rmktm-Tvkoempy-TenofAF (GENVOYA) [phone] MG tablet Take 1 capsule by mouth Daily.   Taking   • gabapentin (NEURONTIN) 300 MG capsule Take  300 mg by mouth 2 (Two) Times a Day.   Taking   • levoFLOXacin (LEVAQUIN) 500 MG tablet Take 500 mg by mouth Daily.      • loperamide (IMODIUM) 2 MG capsule Take 2 mg by mouth 4 (Four) Times a Day As Needed for Diarrhea.      • Multiple Vitamins-Minerals (MULTIVITAMIN WITH MINERALS) tablet tablet Take 1 tablet by mouth Daily.      • Probiotic Product (PROBIOTIC DAILY) capsule Take 1 capsule by mouth Daily.      • topiramate (TOPAMAX) 50 MG tablet Take 50 mg by mouth 2 (Two) Times a Day.   Taking   • warfarin (COUMADIN) 5 MG tablet Take 10 mg by mouth 1 (One) Time Per Week. 10 mg on Saturday and 5 mg all other days      • warfarin (COUMADIN) 5 MG tablet Take 5 mg by mouth See Admin Instructions. 5 mg every day except on Saturday taking 10 mg        Current Meds:     Current Facility-Administered Medications:   •  acetaminophen (TYLENOL) tablet 650 mg, 650 mg, Oral, Q4H PRN, Madeline Elliott MD  •  buPROPion XL (WELLBUTRIN XL) 24 hr tablet 300 mg, 300 mg, Oral, Daily, Madeline Elliott MD  •  Stvxrjt-Pheih-Jirfmajy-TenofAF (GENVOYA) [phone] MG per tablet 1 tablet, 1 tablet, Oral, Daily, Madeline Elliott MD, 1 tablet at 02/13/19 2215  •  gabapentin (NEURONTIN) capsule 300 mg, 300 mg, Oral, Q12H, Madeline Elliott MD, 300 mg at 02/13/19 2016  •  lactobacillus acidophilus (RISAQUAD) capsule 1 capsule, 1 capsule, Oral, Daily, Madeline Elliott MD  •  levoFLOXacin (LEVAQUIN) tablet 500 mg, 500 mg, Oral, Daily, Madeline Elliott MD  •  loperamide (IMODIUM) capsule 2 mg, 2 mg, Oral, 4x Daily PRN, Madeline Elliott MD  •  methylPREDNISolone sodium succinate (SOLU-Medrol) injection 60 mg, 60 mg, Intravenous, Q6H, Madeline Elliott MD, 60 mg at 02/14/19 0616  •  multivitamin with minerals 1 tablet, 1 tablet, Oral, Daily, Madeline Elliott MD  •  nitroglycerin (NITROSTAT) SL tablet 0.4 mg, 0.4 mg, Sublingual, Q5 Min PRN, Madeline Elliott MD  •  ondansetron (ZOFRAN) injection 4 mg, 4 mg, Intravenous, Q6H PRN, Madeline Elliott MD  •  [COMPLETED] Insert peripheral  IV, , , Once **AND** sodium chloride 0.9 % flush 10 mL, 10 mL, Intravenous, PRN, Danielito Yu MD  •  sodium chloride 0.9 % flush 3 mL, 3 mL, Intravenous, Q12H, Madeline Elliott MD, 3 mL at 02/13/19 2017  •  sodium chloride 0.9 % flush 3-10 mL, 3-10 mL, Intravenous, PRN, Madeline Elliott MD  •  topiramate (TOPAMAX) tablet 50 mg, 50 mg, Oral, Q12H, Madeline Elliott MD, 50 mg at 02/13/19 2016  •  [START ON 2/16/2019] warfarin (COUMADIN) tablet 10 mg, 10 mg, Oral, Once per day on Sat, Madeline Elliott MD  •  warfarin (COUMADIN) tablet 5 mg, 5 mg, Oral, Once per day on Sun Mon Tue Wed Thu Fri, Madeline Elliott MD        Social History:   Social History     Tobacco Use   • Smoking status: Current Every Day Smoker     Packs/day: 1.00     Types: Cigarettes   Substance Use Topics   • Alcohol use: Yes      Family History:  Family History   Problem Relation Age of Onset   • Asthma Mother    • Bleeding Disorder Mother    • Bleeding Disorder Father    • Stroke Father    • Hyperlipidemia Father    • Heart disease Father    • Skin cancer Father    • Alcohol abuse Maternal Grandmother    • Arthritis Maternal Grandfather         Review of Systems    Constitutional: + weakness,fatigue,  Chills; denies fever or rigors   Eyes: + vision changes (decreased visual acuity); no eye pain   ENT/oropharynx: + decreased hearing to Left ear; No difficulty swallowing, sore throat, epistaxis   Cardiovascular: + chest pain, orthopnea; chest tightness as per HPI, reports diaphoresis; denies palpitations, paroxysmal nocturnal dyspnea,  dizziness / syncopal episode   Respiratory: + shortness of breath, dyspnea on exertion, cough, wheezing; denies hemoptysis   Gastrointestinal: No abdominal pain, nausea, vomiting, diarrhea, bloody stools; + constipation   Genitourinary: No hematuria, dysuria   Neurological: + numbness to legs (chronic); No headache, tremors, one-sided weakness    Musculoskeletal: No cramps, myalgias,  joint pain, joint swelling   Integument: No  "rash, edema       Constitutional:  Temp:  [98.2 °F (36.8 °C)-98.9 °F (37.2 °C)] 98.2 °F (36.8 °C)  Heart Rate:  [60-69] 60  Resp:  [16-18] 18  BP: ()/(53-74) 108/57   SpO2:  [94 %-100 %] 98 %    Physical Exam   General:  Appears in no acute distress, thin  Eyes: PERTL,  Non icteric  HEENT:  No JVD. Thyroid not visibly enlarged. No mucosal pallor or cyanosis  Respiratory: Respirations regular and unlabored at rest. Rhonchi and faint exp wheezing noted anterior/ lateral/ posterior lung fields.   No crackles or  rubs   Cardiovascular: S1S2 Regular rate and rhythm. Soft GREG apical, no rub or gallop auscultated. No carotid bruits. DP/PT pulses 2+ . No pretibial pitting edema  Gastrointestinal: Abdomen soft, flat, non tender. Bowel sounds present. No hepatosplenomegaly. No ascites  Musculoskeletal: PORTILLO x4. No abnormal movements  Extremities: No digital clubbing or cyanosis  Skin: Color pink. Skin warm and dry to touch. No rashes  No xanthoma,  LSC pacer site well healed.  Noted palpable wires from generator, no erosion.    Neuro: AAO x3 CN II-XII grossly intact  PSYCH: tearful and anxious at times during initial interview, flat affect. Cooperative / pleasant.    /57 (BP Location: Left arm, Patient Position: Lying)   Pulse 60   Temp 98.2 °F (36.8 °C) (Oral)   Resp 18   Ht 167.6 cm (66\")   Wt 60.3 kg (133 lb)   SpO2 98%   BMI 21.47 kg/m²   General appearance: No acute changes   Neck: Trachea midline; NECK, supple, no thyromegaly or lymphadenopathy   Lungs: Normal size and shape, normal breath sounds, equal distribution of air, no rales and rhonchi   CV: S1-S2 regular, no murmurs, no rub, no gallop   Abdomen: Soft, non-tender; no masses , no abnormal abdominal sounds   Extremities: No deformity , normal color , no peripheral edema   Skin: Normal temperature, turgor and texture; no rash, ulcers        Cardiographics  ECG:           Telemetry:         AV-VPacing        Echocardiogram Findings 11/10/16 "     Left Ventricle Left ventricular function is normal. Calculated EF = 62.5%. Estimated EF = 62%. Normal left ventricular cavity size and wall thickness noted. All left ventricular wall segments contract normally. Left ventricular diastolic function is normal. Normal left atrial pressure. There is no evidence of a left ventricular mass or thrombus present.   Right Ventricle Normal cavity size, wall thickness, systolic function and septal motion noted.   Left Atrium Normal left atrial size and volume noted.   Right Atrium Normal right atrial size noted. The inferior vena cava is normally sized. Normal IVC inspiratory collapse of greater than 50% noted.   Aortic Valve The aortic valve is structurally normal. No aortic valve regurgitation is present. No aortic valve stenosis is present.   Mitral Valve The mitral valve is normal in structure. Trace mitral valve regurgitation is present. No significant mitral valve stenosis is present.   Tricuspid Valve The tricuspid valve is normal. No tricuspid valve stenosis is present. Trace tricuspid valve regurgitation is present. Estimated right ventricular systolic pressure from tricuspid regurgitation is normal (<35 mmHg).   Pulmonic Valve The pulmonic valve is structurally normal. There is no significant pulmonic valve stenosis present. There is no pulmonic valve regurgitation present.   Pericardium The pericardium is normal. There is no evidence of pericardial effusion.           [place] [facility] 5/29/11  PATIENT'S NAME:                Karie Potter  ATTENDING PHYSICIAN:           Umesh Kwok MD  ORDERING PHYSICIAN:            Umesh Kwok MD  MED REC #        [id number]      PATIENT #   [id number]  DATE:            05/29/2011    Sex/Age:    F/45  Date of Birth:   1965                F/45  Location:        [address] [place] [address]  Sonographer:     Tio Chen CHANDANA  Exam Indication: Syncope                         TWO - DIMENSIONAL ECHOCARDIOGRAM      CONCLUSION:  1.  Mild left  atrial enlargement.  2.  Mild right atrial enlargement.  3.  Pacemaker leads in the RV.  4.  Mild concentric LV hypertrophy with preserved LV contractility.  5.  Trivial mitral regurgitation.  6.  Mild tricuspid regurgitation with  normal RV systolic pressure.  7.  Small PFO with small left-to-right shunting.  8.  EF 70%    SIGNED:                                   Electronic Signature [id number]                                Fady Melo M.D.      Imaging  Chest X-ray:     Study Result 2/13/19    PA AND LATERAL CHEST 02/13/2019      HISTORY: Shortness of breath. Chest pain.     FINDINGS:  Heart size is within normal limits. There appears to be some  minimal stranding and patchy increased density in the right upper lobe  and left infrahilar region which may represent some minimal pneumonia.  Cardiac pacemaker is unchanged in position from 11/02/2016 study.     IMPRESSION:  Minimal straining and increased density in the right upper  lobe and left infrahilar region as described.           Study Result 2/13/19    CT ANGIOGRAM CHEST WITH IV CONTRAST     HISTORY: Shortness of air, chest pain.     IMPRESSION:  1. No evidence for pulmonary thromboembolic disease.  2. Patchy groundglass opacities consistent with atypical, groundglass  infiltrates best demonstrated at the right upper lobe though also  present in the left upper lobe and right lower lobe.  There is also  central bronchial wall thickening. No lobar consolidation or effusion.  Follow-up chest CT recommended in 6 months to evaluate for resolution.           Lab Review   Results from last 7 days   Lab Units 02/13/19  1451   TROPONIN T ng/mL <0.010         Results from last 7 days   Lab Units 02/13/19  1451   SODIUM mmol/L 143   POTASSIUM mmol/L 4.0   BUN mg/dL 15   CREATININE mg/dL 1.22*   CALCIUM mg/dL 12.0*       Results from last 7 days   Lab Units 02/14/19  0719 02/13/19  1451   WBC 10*3/mm3 7.26 7.95   HEMOGLOBIN g/dL 13.0 13.1   HEMATOCRIT % 39.5 38.8    PLATELETS 10*3/mm3 196 194     Results from last 7 days   Lab Units 02/14/19  0719 02/13/19  1451   INR  2.36* 2.38*           Estimated Creatinine Clearance: 68.1 mL/min (by C-G formula based on SCr of 0.91 mg/dL).      The following medical decision was discussed in detail with Dr. Zamorano.    Assessment/ Plan   - Complete heart block  s/p PPM placement: recent generator change out from [employer] to [employer] 11/2018.  Had device reprogrammed 2/8/19  has had increased DAVIS, feeling of faster rate and pounding in ear since then.  Will have [employer] rep interrogate device today.  Discussed with her risks infection of device redo and we will reevaluate status of pacer once interrogation complete. She verbalized understanding.       - Nonischemic cardiomyopathy: EF recovered.  Last echo 2016 showing EF 70%. This was diagnosed after her thyroid ablation and in the setting of 3rd degree AVB.      - Chest pain: some atypical and typical features.   Trop neg x 1.  Will repeat trop.  EKG non diagnostic d/t AV-Vpacing.  She has not had any ischemia that she can recall since her original dx of cardiomyopathy in [place]  20 years ago.   Multiple risk factors for CAD with FH, tobacco use, and HIV medications.  Will check lipid panel.   Discussed with pt ischemia work up with stress nuc lexiscan once she has improved from pulmonary standpoint and she is agreeable.     - Murmur: + GREG apical noted on exam.  Reports she has had hx since childhood of murmur.  Check echo.     - Tobacco use: states she quit 3 days ago.  Long term smoking exposure.       - Factor V Leiden with hx of PE and DVT: management per IM.  Is on warfarin with therapeutic INR    - HIV : ID has been consulted    - Small PFO noted on echo 2011 with left to right shunting: is on warfarin therapy.       -  Hx of thyroid toxicosis with radioactive thyroid ablation over 20 years ago. Is on replacement therapy.      Labs/tests ordered: TSH/ Free T4, Echo, trop,  and lipid panel.       Marsha Chang, APRN  2/14/2019, 8:17 AM    Patient personally interviewed and above subjective findings personally confirmed during a face to face contact with patient today  All findings of physical examination confirmed  All labs, cardiac procedures rtinent radiographs of the last 24 hours personally reviewed  Impression and plans discussed/elaborated and implemented jointly as described above   Mk Zamorano MD      EMR Dragon/Transcription:   Dictated utilizing Dragon dictation

## 2019-02-14 NOTE — PLAN OF CARE
Problem: Patient Care Overview  Goal: Plan of Care Review  Outcome: Ongoing (interventions implemented as appropriate)   02/14/19 0457   Coping/Psychosocial   Plan of Care Reviewed With patient   Plan of Care Review   Progress no change   OTHER   Outcome Summary IV Abx given once, D/c fluid. Pt on RA, pace maker (dual pace). Pt has non productive cough. Cardio and ID consult. VSS. Will continue to monitor.      Goal: Discharge Needs Assessment  Outcome: Ongoing (interventions implemented as appropriate)    Goal: Interprofessional Rounds/Family Conf  Outcome: Ongoing (interventions implemented as appropriate)      Problem: Pneumonia (Adult)  Goal: Signs and Symptoms of Listed Potential Problems Will be Absent, Minimized or Managed (Pneumonia)  Outcome: Ongoing (interventions implemented as appropriate)      Problem: HIV/AIDS (Adult)  Goal: Signs and Symptoms of Listed Potential Problems Will be Absent, Minimized or Managed (HIV/AIDS)  Outcome: Ongoing (interventions implemented as appropriate)

## 2019-02-15 ENCOUNTER — READMISSION MANAGEMENT (OUTPATIENT)
Dept: CALL CENTER | Facility: HOSPITAL | Age: 54
End: 2019-02-15

## 2019-02-16 NOTE — OUTREACH NOTE
Prep Survey      Responses   Facility patient discharged from?  [place]   Is patient eligible?  Yes   Discharge diagnosis  Bronchitis with bronchospasm,  RSV,  STEPHANIE,  HIV, Factor V Leiden   Does the patient have one of the following disease processes/diagnoses(primary or secondary)?  Other   Does the patient have Home health ordered?  No   Is there a DME ordered?  No   Comments regarding appointments  patient asking for new PCP,  list of [facility] physicians given on discharge   Prep survey completed?  Yes          Caryn Vazquez RN

## 2019-02-18 ENCOUNTER — READMISSION MANAGEMENT (OUTPATIENT)
Dept: CALL CENTER | Facility: HOSPITAL | Age: 54
End: 2019-02-18

## 2019-02-18 LAB
BACTERIA SPEC AEROBE CULT: NORMAL
BACTERIA SPEC AEROBE CULT: NORMAL

## 2019-02-18 NOTE — PROGRESS NOTES
Case Management Discharge Note    Final Note: patient was DC'd home 2/14         Other: Other(private car)    Final Discharge Disposition Code: 01 - home or self-care

## 2019-02-18 NOTE — OUTREACH NOTE
Medical Week 1 Survey      Responses   Facility patient discharged from?  [place]   Does the patient have one of the following disease processes/diagnoses(primary or secondary)?  Other   Is there a successful TCM telephone encounter documented?  No   Week 1 attempt successful?  No   Unsuccessful attempts  Attempt 1          Seferino Marino RN

## 2019-02-19 ENCOUNTER — READMISSION MANAGEMENT (OUTPATIENT)
Dept: CALL CENTER | Facility: HOSPITAL | Age: 54
End: 2019-02-19

## 2019-02-19 NOTE — OUTREACH NOTE
Medical Week 1 Survey      Responses   Facility patient discharged from?  [place]   Does the patient have one of the following disease processes/diagnoses(primary or secondary)?  Other   Is there a successful TCM telephone encounter documented?  No   Week 1 attempt successful?  No   Unsuccessful attempts  Attempt 2   Revoke  Decline to participate          Leandra Ansari RN

## 2019-05-08 ENCOUNTER — TRANSCRIBE ORDERS (OUTPATIENT)
Dept: ADMINISTRATIVE | Facility: HOSPITAL | Age: 54
End: 2019-05-08

## 2019-05-08 ENCOUNTER — LAB (OUTPATIENT)
Dept: LAB | Facility: HOSPITAL | Age: 54
End: 2019-05-08

## 2019-05-08 DIAGNOSIS — Z79.01 LONG TERM CURRENT USE OF ANTICOAGULANT THERAPY: ICD-10-CM

## 2019-05-08 DIAGNOSIS — Z01.818 PRE-OP TESTING: Primary | ICD-10-CM

## 2019-05-08 DIAGNOSIS — Z01.818 PRE-OP TESTING: ICD-10-CM

## 2019-05-08 LAB
ANION GAP SERPL CALCULATED.3IONS-SCNC: 9.7 MMOL/L
APTT PPP: 31.7 SECONDS (ref 22.7–35.4)
BUN BLD-MCNC: 17 MG/DL (ref 6–20)
BUN/CREAT SERPL: 17.2 (ref 7–25)
CALCIUM SPEC-SCNC: 11 MG/DL (ref 8.6–10.5)
CHLORIDE SERPL-SCNC: 107 MMOL/L (ref 98–107)
CO2 SERPL-SCNC: 24.3 MMOL/L (ref 22–29)
CREAT BLD-MCNC: 0.99 MG/DL (ref 0.57–1)
GFR SERPL CREATININE-BSD FRML MDRD: 59 ML/MIN/1.73
GLUCOSE BLD-MCNC: 100 MG/DL (ref 65–99)
INR PPP: 1.35 (ref 0.9–1.1)
POTASSIUM BLD-SCNC: 4 MMOL/L (ref 3.5–5.2)
PROTHROMBIN TIME: 16.4 SECONDS (ref 11.7–14.2)
SODIUM BLD-SCNC: 141 MMOL/L (ref 136–145)

## 2019-05-08 PROCEDURE — 85730 THROMBOPLASTIN TIME PARTIAL: CPT | Performed by: PLASTIC SURGERY

## 2019-05-08 PROCEDURE — 85610 PROTHROMBIN TIME: CPT | Performed by: PLASTIC SURGERY

## 2019-05-08 PROCEDURE — 80048 BASIC METABOLIC PNL TOTAL CA: CPT

## 2019-05-08 PROCEDURE — 36415 COLL VENOUS BLD VENIPUNCTURE: CPT

## 2019-06-10 ENCOUNTER — TELEPHONE (OUTPATIENT)
Dept: OBSTETRICS AND GYNECOLOGY | Age: 54
End: 2019-06-10

## 2019-06-10 NOTE — TELEPHONE ENCOUNTER
Former pt of Dr Carter has severe osteoporosis. Pt is looking for gyn care and wants management of her osteoporosis. Pt states every other issue is under control. Pt INS: [place] [facility] Please Advise

## 2019-06-18 NOTE — TELEPHONE ENCOUNTER
*Can wait for Dr Carter* Dr Carter, do have any referral providers that this pt can call? Please Advise

## 2021-03-26 ENCOUNTER — BULK ORDERING (OUTPATIENT)
Dept: CASE MANAGEMENT | Facility: OTHER | Age: 56
End: 2021-03-26

## 2021-03-26 DIAGNOSIS — Z23 IMMUNIZATION DUE: ICD-10-CM

## 2022-01-03 ENCOUNTER — HOSPITAL ENCOUNTER (OUTPATIENT)
Dept: GENERAL RADIOLOGY | Facility: HOSPITAL | Age: 57
Discharge: HOME | End: 2022-01-03

## 2022-01-03 ENCOUNTER — TRANSCRIBE ORDERS (OUTPATIENT)
Dept: ADMINISTRATIVE | Facility: HOSPITAL | Age: 57
End: 2022-01-03

## 2022-01-03 DIAGNOSIS — M25.511 ACUTE PAIN OF RIGHT SHOULDER: ICD-10-CM

## 2022-01-03 DIAGNOSIS — R07.89 RIGHT-SIDED CHEST WALL PAIN: ICD-10-CM

## 2022-01-03 DIAGNOSIS — M25.511 ACUTE PAIN OF RIGHT SHOULDER: Primary | ICD-10-CM

## 2022-01-03 PROCEDURE — 71101 X-RAY EXAM UNILAT RIBS/CHEST: CPT

## 2022-01-03 PROCEDURE — 73030 X-RAY EXAM OF SHOULDER: CPT

## 2023-08-03 DIAGNOSIS — M19.072 OSTEOARTHRITIS OF MIDTARSAL JOINT OF LEFT FOOT: Primary | ICD-10-CM

## 2023-08-03 DIAGNOSIS — M25.572 PAIN IN JOINT OF LEFT FOOT: ICD-10-CM

## 2024-01-05 NOTE — PLAN OF CARE
Problem: Patient Care Overview  Goal: Plan of Care Review  Outcome: Ongoing (interventions implemented as appropriate)   02/14/19 7831   Coping/Psychosocial   Plan of Care Reviewed With patient   Plan of Care Review   Progress improving   OTHER   Outcome Summary [employer] stopped by and interograted the patient's pacemaker. Documentation in the bedside chart. Resp Panel came back positive for RSV. Contact Isolation is maintained. Pt continues to have dimished to clear lung sounds. No productive cough. Patient has been cleared by Dr. Gregory and Dr. Roldan. Awaiting clearance from Cardio for discharge home today. VSS will continue to monitor. 02/14/2019 1457     Goal: Individualization and Mutuality  Outcome: Ongoing (interventions implemented as appropriate)    Goal: Discharge Needs Assessment  Outcome: Ongoing (interventions implemented as appropriate)    Goal: Interprofessional Rounds/Family Conf  Outcome: Ongoing (interventions implemented as appropriate)      Problem: Pneumonia (Adult)  Goal: Signs and Symptoms of Listed Potential Problems Will be Absent, Minimized or Managed (Pneumonia)  Outcome: Ongoing (interventions implemented as appropriate)      Problem: HIV/AIDS (Adult)  Goal: Signs and Symptoms of Listed Potential Problems Will be Absent, Minimized or Managed (HIV/AIDS)  Outcome: Ongoing (interventions implemented as appropriate)         90

## 2024-09-11 ENCOUNTER — TRANSCRIBE ORDERS (OUTPATIENT)
Dept: ADMINISTRATIVE | Facility: HOSPITAL | Age: 59
End: 2024-09-11
Payer: MEDICARE

## 2024-09-11 DIAGNOSIS — M19.90 OSTEOARTHRITIS, UNSPECIFIED OSTEOARTHRITIS TYPE, UNSPECIFIED SITE: ICD-10-CM

## 2024-09-11 DIAGNOSIS — I50.9 HEART FAILURE, UNSPECIFIED HF CHRONICITY, UNSPECIFIED HEART FAILURE TYPE: Primary | ICD-10-CM

## 2024-09-11 DIAGNOSIS — Z12.31 BREAST CANCER SCREENING BY MAMMOGRAM: ICD-10-CM

## 2024-09-13 ENCOUNTER — TRANSCRIBE ORDERS (OUTPATIENT)
Age: 59
End: 2024-09-13
Payer: MEDICARE

## 2024-09-13 DIAGNOSIS — I50.9 HEART FAILURE, UNSPECIFIED HF CHRONICITY, UNSPECIFIED HEART FAILURE TYPE: Primary | ICD-10-CM

## 2024-09-13 DIAGNOSIS — Q21.12 PATENT FORAMEN OVALE: ICD-10-CM

## 2024-10-22 ENCOUNTER — APPOINTMENT (OUTPATIENT)
Dept: GENERAL RADIOLOGY | Facility: HOSPITAL | Age: 59
End: 2024-10-22
Payer: MEDICARE

## 2024-10-22 ENCOUNTER — HOSPITAL ENCOUNTER (OUTPATIENT)
Facility: HOSPITAL | Age: 59
Discharge: HOME HEALTH | End: 2024-10-23
Attending: EMERGENCY MEDICINE | Admitting: INTERNAL MEDICINE
Payer: MEDICARE

## 2024-10-22 DIAGNOSIS — S52.692A OTHER CLOSED FRACTURE OF DISTAL END OF LEFT ULNA, INITIAL ENCOUNTER: ICD-10-CM

## 2024-10-22 DIAGNOSIS — S52.552A OTHER CLOSED EXTRA-ARTICULAR FRACTURE OF DISTAL END OF LEFT RADIUS, INITIAL ENCOUNTER: ICD-10-CM

## 2024-10-22 DIAGNOSIS — S52.502A CLOSED FRACTURE OF DISTAL END OF LEFT RADIUS, UNSPECIFIED FRACTURE MORPHOLOGY, INITIAL ENCOUNTER: Primary | ICD-10-CM

## 2024-10-22 DIAGNOSIS — S82.002A CLOSED NONDISPLACED FRACTURE OF LEFT PATELLA, UNSPECIFIED FRACTURE MORPHOLOGY, INITIAL ENCOUNTER: ICD-10-CM

## 2024-10-22 PROBLEM — S52.509A DISTAL RADIAL FRACTURE: Status: ACTIVE | Noted: 2024-10-22

## 2024-10-22 LAB
ALBUMIN SERPL-MCNC: 4 G/DL (ref 3.5–5.2)
ALBUMIN/GLOB SERPL: 1.4 G/DL
ALP SERPL-CCNC: 54 U/L (ref 39–117)
ALT SERPL W P-5'-P-CCNC: 11 U/L (ref 1–33)
ANION GAP SERPL CALCULATED.3IONS-SCNC: 8 MMOL/L (ref 5–15)
APTT PPP: 33.3 SECONDS (ref 24.3–38.1)
AST SERPL-CCNC: 24 U/L (ref 1–32)
BASOPHILS # BLD AUTO: 0.07 10*3/MM3 (ref 0–0.2)
BASOPHILS NFR BLD AUTO: 1.2 % (ref 0–1.5)
BILIRUB SERPL-MCNC: 0.3 MG/DL (ref 0–1.2)
BUN SERPL-MCNC: 18 MG/DL (ref 6–20)
BUN/CREAT SERPL: 17.1 (ref 7–25)
CALCIUM SPEC-SCNC: 10.5 MG/DL (ref 8.6–10.5)
CHLORIDE SERPL-SCNC: 107 MMOL/L (ref 98–107)
CO2 SERPL-SCNC: 23 MMOL/L (ref 22–29)
CREAT SERPL-MCNC: 1.05 MG/DL (ref 0.57–1)
DEPRECATED RDW RBC AUTO: 47.7 FL (ref 37–54)
EGFRCR SERPLBLD CKD-EPI 2021: 61.3 ML/MIN/1.73
EOSINOPHIL # BLD AUTO: 0.15 10*3/MM3 (ref 0–0.4)
EOSINOPHIL NFR BLD AUTO: 2.5 % (ref 0.3–6.2)
ERYTHROCYTE [DISTWIDTH] IN BLOOD BY AUTOMATED COUNT: 12.9 % (ref 12.3–15.4)
GLOBULIN UR ELPH-MCNC: 2.9 GM/DL
GLUCOSE SERPL-MCNC: 82 MG/DL (ref 65–99)
HCT VFR BLD AUTO: 37.8 % (ref 34–46.6)
HGB BLD-MCNC: 12.2 G/DL (ref 12–15.9)
HOLD SPECIMEN: NORMAL
HOLD SPECIMEN: NORMAL
IMM GRANULOCYTES # BLD AUTO: 0.01 10*3/MM3 (ref 0–0.05)
IMM GRANULOCYTES NFR BLD AUTO: 0.2 % (ref 0–0.5)
INR PPP: 1.11 (ref 0.9–1.1)
LYMPHOCYTES # BLD AUTO: 1.31 10*3/MM3 (ref 0.7–3.1)
LYMPHOCYTES NFR BLD AUTO: 21.7 % (ref 19.6–45.3)
MCH RBC QN AUTO: 32.4 PG (ref 26.6–33)
MCHC RBC AUTO-ENTMCNC: 32.3 G/DL (ref 31.5–35.7)
MCV RBC AUTO: 100.5 FL (ref 79–97)
MONOCYTES # BLD AUTO: 0.46 10*3/MM3 (ref 0.1–0.9)
MONOCYTES NFR BLD AUTO: 7.6 % (ref 5–12)
NEUTROPHILS NFR BLD AUTO: 4.04 10*3/MM3 (ref 1.7–7)
NEUTROPHILS NFR BLD AUTO: 66.8 % (ref 42.7–76)
NRBC BLD AUTO-RTO: 0 /100 WBC (ref 0–0.2)
PLATELET # BLD AUTO: 178 10*3/MM3 (ref 140–450)
PMV BLD AUTO: 9.8 FL (ref 6–12)
POTASSIUM SERPL-SCNC: 4.1 MMOL/L (ref 3.5–5.2)
PROT SERPL-MCNC: 6.9 G/DL (ref 6–8.5)
PROTHROMBIN TIME: 14.7 SECONDS (ref 12.1–15)
RBC # BLD AUTO: 3.76 10*6/MM3 (ref 3.77–5.28)
SODIUM SERPL-SCNC: 138 MMOL/L (ref 136–145)
WBC NRBC COR # BLD AUTO: 6.04 10*3/MM3 (ref 3.4–10.8)
WHOLE BLOOD HOLD COAG: NORMAL
WHOLE BLOOD HOLD SPECIMEN: NORMAL

## 2024-10-22 PROCEDURE — 85730 THROMBOPLASTIN TIME PARTIAL: CPT | Performed by: NURSE PRACTITIONER

## 2024-10-22 PROCEDURE — 85025 COMPLETE CBC W/AUTO DIFF WBC: CPT | Performed by: NURSE PRACTITIONER

## 2024-10-22 PROCEDURE — 99285 EMERGENCY DEPT VISIT HI MDM: CPT | Performed by: EMERGENCY MEDICINE

## 2024-10-22 PROCEDURE — 73130 X-RAY EXAM OF HAND: CPT

## 2024-10-22 PROCEDURE — 85610 PROTHROMBIN TIME: CPT | Performed by: NURSE PRACTITIONER

## 2024-10-22 PROCEDURE — G0378 HOSPITAL OBSERVATION PER HR: HCPCS

## 2024-10-22 PROCEDURE — 99222 1ST HOSP IP/OBS MODERATE 55: CPT | Performed by: HOSPITALIST

## 2024-10-22 PROCEDURE — 80053 COMPREHEN METABOLIC PANEL: CPT | Performed by: NURSE PRACTITIONER

## 2024-10-22 PROCEDURE — 73090 X-RAY EXAM OF FOREARM: CPT

## 2024-10-22 PROCEDURE — 73110 X-RAY EXAM OF WRIST: CPT

## 2024-10-22 PROCEDURE — 93005 ELECTROCARDIOGRAM TRACING: CPT | Performed by: HOSPITALIST

## 2024-10-22 PROCEDURE — 99204 OFFICE O/P NEW MOD 45 MIN: CPT | Performed by: INTERNAL MEDICINE

## 2024-10-22 PROCEDURE — 73562 X-RAY EXAM OF KNEE 3: CPT

## 2024-10-22 RX ORDER — NICOTINE 21 MG/24HR
1 PATCH, TRANSDERMAL 24 HOURS TRANSDERMAL
Status: DISCONTINUED | OUTPATIENT
Start: 2024-10-22 | End: 2024-10-23

## 2024-10-22 RX ORDER — HYDROCODONE BITARTRATE AND ACETAMINOPHEN 7.5; 325 MG/1; MG/1
1 TABLET ORAL ONCE
Status: COMPLETED | OUTPATIENT
Start: 2024-10-22 | End: 2024-10-22

## 2024-10-22 RX ORDER — SODIUM CHLORIDE 0.9 % (FLUSH) 0.9 %
10 SYRINGE (ML) INJECTION AS NEEDED
Status: DISCONTINUED | OUTPATIENT
Start: 2024-10-22 | End: 2024-10-23 | Stop reason: HOSPADM

## 2024-10-22 RX ORDER — HYDROCODONE BITARTRATE AND ACETAMINOPHEN 7.5; 325 MG/1; MG/1
1 TABLET ORAL EVERY 4 HOURS PRN
Status: DISCONTINUED | OUTPATIENT
Start: 2024-10-22 | End: 2024-10-23

## 2024-10-22 RX ADMIN — HYDROCODONE BITARTRATE AND ACETAMINOPHEN 1 TABLET: 7.5; 325 TABLET ORAL at 17:41

## 2024-10-22 RX ADMIN — HYDROCODONE BITARTRATE AND ACETAMINOPHEN 1 TABLET: 7.5; 325 TABLET ORAL at 12:33

## 2024-10-22 RX ADMIN — NICOTINE 1 PATCH: 21 PATCH, EXTENDED RELEASE TRANSDERMAL at 15:27

## 2024-10-22 NOTE — H&P
[facility]     Naa Vale MD    CHIEF COMPLAINT: Fall w/ fracture    HISTORY OF PRESENT ILLNESS:    Ms. Potter is a pleasant 59-year-old female who presents after suffering a fall at home while attempting to clean her fish tank.  She reports that she had remove the filter and slipped and fell.  She knew she had fractured her left wrist because it hit the edge and noticed a deformity.  She also has significant pain in her left leg.  She has a history of neuropathy of her feet.  She did not hit her head, and she did not lose consciousness after the fall.      Past Medical History:   Diagnosis Date    Abnormal blood chemistry     Allergic rhinitis     Anxiety     Arthritis     Blood clotting disorder     Bronchitis     CAD (coronary artery disease)     CHF (congestive heart failure)     Colon cancer 2012    Complete heart block 1999    [employer] pacemaker, battery changed in 2009    Contusion of left shoulder     COPD (chronic obstructive pulmonary disease)     Crohn's disease     Depression     DVT (deep venous thrombosis) 2005    Dyspareunia, female     Factor V Leiden     Fracture of pubic ramus     Hip fracture     History of prior pregnancies     X2    HIV disease     Hypothyroidism     IBS (irritable bowel syndrome)     Lumbar strain     Migraine     Peripheral neuropathy     Pilonidal cyst     Pulmonary embolus 1998    Rectal cancer 2003    Skin cancer     STD (female)      Past Surgical History:   Procedure Laterality Date    LUNG SURGERY      PACEMAKER IMPLANTATION  2017    PILONIDAL CYSTECTOMY      RECTAL SURGERY      destruction of rectal tumor    THYROIDECTOMY, PARTIAL      TONSILLECTOMY       Family History   Problem Relation Age of Onset    Asthma Mother     Bleeding Disorder Mother     Bleeding Disorder Father     Stroke Father     Hyperlipidemia Father     Heart disease Father     Skin cancer Father     Alcohol abuse Maternal Grandmother     Arthritis Maternal  "Grandfather      Social History     Tobacco Use    Smoking status: Every Day     Current packs/day: 1.00     Types: Cigarettes    Smokeless tobacco: Never   Vaping Use    Vaping status: Never Used   Substance Use Topics    Alcohol use: Yes    Drug use: No     Medications Prior to Admission   Medication Sig Dispense Refill Last Dose/Taking    apixaban (ELIQUIS) 5 MG tablet tablet Take 1 tablet by mouth Every 12 (Twelve) Hours.   10/22/2024 Morning    buPROPion XL (WELLBUTRIN XL) 300 MG 24 hr tablet Take 1 tablet by mouth Daily.   10/22/2024 Morning    topiramate (TOPAMAX) 50 MG tablet Take 1 tablet by mouth 2 (Two) Times a Day.   10/22/2024 Morning    Gukyqjx-Cebrt-Jacfeyca-TenofAF (GENVOYA) 884-746-112-10 MG tablet Take 1 capsule by mouth Daily.       gabapentin (NEURONTIN) 300 MG capsule Take 1 capsule by mouth 2 (Two) Times a Day.   10/21/2024 Bedtime    loperamide (IMODIUM) 2 MG capsule Take 4 capsules by mouth Daily. Takes 4 tabs once daily   10/22/2024 Morning    Multiple Vitamins-Minerals (MULTIVITAMIN WITH MINERALS) tablet tablet Take 1 tablet by mouth Daily.   10/22/2024 Morning     Allergies:  Azithromycin, Erythromycin, Penicillins, and Sulfamethoxazole-trimethoprim    REVIEW OF SYSTEMS:  Please see the above history of present illness for pertinent positives and negatives.  The remainder of the patient's systems have been reviewed and are negative.    Vital Signs  Temp:  [97.2 °F (36.2 °C)-97.8 °F (36.6 °C)] 97.8 °F (36.6 °C)  Heart Rate:  [67] 67  Resp:  [16] 16  BP: (127-158)/(70-82) 158/70  Oxygen Therapy  SpO2: 100 %  Pulse Oximetry Type: Intermittent  Device (Oxygen Therapy): room air}  Body mass index is 21.64 kg/m².    Flowsheet Rows      Flowsheet Row First Filed Value   Admission Height 167.6 cm (66\") Documented at 10/22/2024 1217   Admission Weight 60.8 kg (134 lb) Documented at 10/22/2024 1217               Physical Exam:  Physical Exam   Constitutional: Patient appears well developed and well " nourished and in no acute distress   HEENT:   Head: Normocephalic and atraumatic.   Eyes:  Pupils are equal, round, and reactive to light. EOM are intact. Sclerae are anicteric and noninjected.  Mouth and Throat: Patient has moist mucous membranes. Oropharynx is clear of any erythema or exudate.     Cardiovascular: Regular rate, regular rhythm, S1 normal and S2 normal.  Exam reveals no gallop and no friction rub.  No murmur heard.  Pulmonary/Chest: Lungs are clear to auscultation bilaterally. No respiratory distress. No wheezes. No rhonchi. No rales.   Abdominal: Soft. Bowel sounds are normal. There is no tenderness.   Musculoskeletal: Normal muscle tone  Extremities: No edema.  Left wrist is in a splint and neurovascularly intact.  Neurological: Cranial nerves II-XII are grossly intact with no focal deficits.    Emotional Behavior:    Judgment and Insight: Normal   Mental Status:  Alertness  Normal   Memory:  Appears intact   Mood and Affect:         Depression  None               Anxiety  None    Debilities:   Physical Weakness  None   Handicaps  None   Disabilities  None   Agitation  None     Results Review:    I reviewed the patient's new clinical results.  Lab Results (most recent)       Procedure Component Value Units Date/Time    Comprehensive Metabolic Panel [[id number]]  (Abnormal) Collected: 10/22/24 1424    Specimen: Blood Updated: 10/22/24 1454     Glucose 82 mg/dL      BUN 18 mg/dL      Creatinine 1.05 mg/dL      Sodium 138 mmol/L      Potassium 4.1 mmol/L      Chloride 107 mmol/L      CO2 23.0 mmol/L      Calcium 10.5 mg/dL      Total Protein 6.9 g/dL      Albumin 4.0 g/dL      ALT (SGPT) 11 U/L      AST (SGOT) 24 U/L      Alkaline Phosphatase 54 U/L      Total Bilirubin 0.3 mg/dL      Globulin 2.9 gm/dL      A/G Ratio 1.4 g/dL      BUN/Creatinine Ratio 17.1     Anion Gap 8.0 mmol/L      eGFR 61.3 mL/min/1.73     Narrative:      GFR Normal >60  Chronic Kidney Disease <60  Kidney Failure <15      aPTT  [[id number]]  (Normal) Collected: 10/22/24 1424    Specimen: Blood Updated: 10/22/24 1448     PTT 33.3 seconds     Narrative:      PTT = The equivalent PTT values for the therapeutic range of heparin levels at 0.1 to 0.7 U/ml are 53 to 110 seconds.      Protime-INR [[id number]]  (Abnormal) Collected: 10/22/24 1424    Specimen: Blood Updated: 10/22/24 1448     Protime 14.7 Seconds      INR 1.11    Narrative:      Therapeutic Ranges for INR: 2.0-3.0 (PT 20-30)                              2.5-3.5 (PT 25-34)    [place] Draw [[id number]] Collected: 10/22/24 1424    Specimen: Blood Updated: 10/22/24 1446    Narrative:      The following orders were created for panel order [place] Draw.  Procedure                               Abnormality         Status                     ---------                               -----------         ------                     Green Top (Gel)[[id number]]                                  Final result               Lavender Top[[id number]]                                     Final result               Gold Top - SST[[id number]]                                                              Light Blue Top[[id number]]                                   Final result                 Please view results for these tests on the individual orders.    Lavender Top [[id number]] Collected: 10/22/24 1424    Specimen: Blood Updated: 10/22/24 1446     Extra Tube hold for add-on     Comment: Auto resulted       Green Top (Gel) [[id number]] Collected: 10/22/24 1424    Specimen: Blood Updated: 10/22/24 1446     Extra Tube Hold for add-ons.     Comment: Auto resulted.       Light Blue Top [[id number]] Collected: 10/22/24 1424    Specimen: Blood Updated: 10/22/24 1446     Extra Tube Hold for add-ons.     Comment: Auto resulted       CBC & Differential [[id number]]  (Abnormal) Collected: 10/22/24 1424    Specimen: Blood Updated: 10/22/24 1438    Narrative:      The following orders were created for panel order CBC &  Differential.  Procedure                               Abnormality         Status                     ---------                               -----------         ------                     CBC Auto Differential[[id number]]        Abnormal            Final result                 Please view results for these tests on the individual orders.    CBC Auto Differential [[id number]]  (Abnormal) Collected: 10/22/24 1424    Specimen: Blood Updated: 10/22/24 1438     WBC 6.04 10*3/mm3      RBC 3.76 10*6/mm3      Hemoglobin 12.2 g/dL      Hematocrit 37.8 %      .5 fL      MCH 32.4 pg      MCHC 32.3 g/dL      RDW 12.9 %      RDW-SD 47.7 fl      MPV 9.8 fL      Platelets 178 10*3/mm3      Neutrophil % 66.8 %      Lymphocyte % 21.7 %      Monocyte % 7.6 %      Eosinophil % 2.5 %      Basophil % 1.2 %      Immature Grans % 0.2 %      Neutrophils, Absolute 4.04 10*3/mm3      Lymphocytes, Absolute 1.31 10*3/mm3      Monocytes, Absolute 0.46 10*3/mm3      Eosinophils, Absolute 0.15 10*3/mm3      Basophils, Absolute 0.07 10*3/mm3      Immature Grans, Absolute 0.01 10*3/mm3      nRBC 0.0 /100 WBC             Imaging Results (Most Recent)       Procedure Component Value Units Date/Time    XR Knee 3 View Left [[id number]] Collected: 10/22/24 1300     Updated: 10/22/24 1305    Narrative:      XR KNEE 3 VW LEFT    Date of Exam: 10/22/2024 12:52 PM EDT    Indication: Fall, left knee pain    Comparison: None available.    Findings:  There is evidence for a nondisplaced longitudinal fracture involving the patella. There is no separation of fracture fragments. The articulations of the knee remain intact without malalignment. There is no joint effusion.      Impression:      Impression:  1.Evidence for a potential longitudinal nondisplaced fracture of the patella. Recommend correlation for point tenderness localizing to the patella.  2.The articulations of the knee remain intact without dislocation.  3.There is no joint  effusion.      Electronically Signed: Peng Erwin MD    10/22/2024 1:02 PM EDT    Workstation ID: [id number]    XR Forearm 2 View Left [[id number]] Collected: 10/22/24 1259     Updated: 10/22/24 1304    Narrative:      XR FOREARM 2 VW LEFT, XR HAND 3+ VW LEFT    Date of Exam: 10/22/2024 12:41 PM EDT    Indication: Fall, pain    Comparison: None available.    Findings:  There is comminuted fracture involving the distal radius with approximately 10 degrees dorsal angulation of the distal fracture fragment and 5 mm of distraction. No intra-articular extension is identified. No other fracture identified. Alignment is   normal. Joint space is adequately maintained. Soft tissues are unremarkable.      Impression:      Impression:  Comminuted impaction fracture involving the distal radius.      Electronically Signed: David Savage MD    10/22/2024 1:01 PM EDT    Workstation ID: [id number]    XR Hand 3+ View Left [[id number]] Collected: 10/22/24 1259     Updated: 10/22/24 1304    Narrative:      XR FOREARM 2 VW LEFT, XR HAND 3+ VW LEFT    Date of Exam: 10/22/2024 12:41 PM EDT    Indication: Fall, pain    Comparison: None available.    Findings:  There is comminuted fracture involving the distal radius with approximately 10 degrees dorsal angulation of the distal fracture fragment and 5 mm of distraction. No intra-articular extension is identified. No other fracture identified. Alignment is   normal. Joint space is adequately maintained. Soft tissues are unremarkable.      Impression:      Impression:  Comminuted impaction fracture involving the distal radius.      Electronically Signed: David Savage MD    10/22/2024 1:01 PM EDT    Workstation ID: [id number]    XR Wrist 3+ View Left [[id number]] Collected: 10/22/24 1258     Updated: 10/22/24 1302    Narrative:      XR WRIST 3+ VW LEFT    Date of Exam: 10/22/2024 12:36 PM EDT    Indication: Fall, left wrist pain    Comparison: None available.    Findings:  There is a transverse  impacted fracture involving the distal metaphysis of the left radius. There is mild dorsal angulation of the distal fracture fragment. There may be an associated nondisplaced fracture of the distal ulnar metaphysis as well. The   articulations of the wrist remain intact without dislocation. Mild changes of osteoarthritis are noted at the wrist. Surrounding soft tissue swelling is observed.      Impression:      Impression:  1.Transverse impacted fracture involving the distal metaphysis of the radius. There is mild dorsal angulation of the distal fracture fragment.  2.There may be a nondisplaced transverse fracture of the distal ulna as well.  3.The articulations of the wrist remain intact without dislocation.      Electronically Signed: Peng Erwin MD    10/22/2024 12:59 PM EDT    Workstation ID: [id number]            ECG/EMG Results (most recent)       Pending              Assessment & Plan     Left Distal Radius Fracture: Ortho consulted from the ER.  Plan for open reduction internal fixation tomorrow.  I have held her Eliquis dosing this evening.  Left patellar fracture: Ortho has been consulted as noted above.  Await their evaluation.  COPD: Not in acute exacerbation.  Nonmodifiable risk factor for surgery.  Factor V Leiden deficiency: She had previously been on warfarin but has since been changed to Eliquis.  This has been held noted above.  History of HIV: Reviewed most recent clinic note.  It appears her last viral load was undetectable.  She will continue on her monthly medication.  History of complete heart block status post dual-chamber pacemaker placement: She used to follow with Dr. Fito Simon, but he is no longer with [facility] so she is going to see a physician with local cardiology group.  Echocardiogram is scheduled for this Friday.    I discussed the patient's findings and my recommendations with patient.     Pierre Xavier MD  10/22/24  18:06 EDT

## 2024-10-22 NOTE — CONSULTS
Orthopedic Consult      Patient: Karie Potter    Date of Admission: 10/22/2024 11:59 AM    YOB: 1965    Medical Record Number: [id number]    Attending Physician: Pierre Xavier MD  Consulting Physician:       Chief Complaints: Distal radial fracture [S52.509A]  Closed fracture of distal end of left radius, unspecified fracture morphology, initial encounter [S52.502A]  Other closed extra-articular fracture of distal end of left radius, initial encounter [S52.552A]  Other closed fracture of distal end of left ulna, initial encounter [S52.692A]  Closed nondisplaced fracture of left patella, unspecified fracture morphology, initial encounter [S82.002A]      History of Present Illness: 59 y.o. female admitted to [facility] to services of Pierre Xavier MD with Distal radial fracture [S52.509A]  Closed fracture of distal end of left radius, unspecified fracture morphology, initial encounter [S52.502A]  Other closed extra-articular fracture of distal end of left radius, initial encounter [S52.552A]  Other closed fracture of distal end of left ulna, initial encounter [S52.692A]  Closed nondisplaced fracture of left patella, unspecified fracture morphology, initial encounter [S82.002A].     Patient is a very pleasant 59-year-old right-hand-dominant female who was cleaning a fish tank on a [employer] and lost her footing falling onto her left flexed knee and her left extended wrist.  She had immediate pain and deformity of her left wrist and increasing pain about her left knee.  She denies any bleeding.  She denies any numbness or tingling in her hand but does have neuropathy in her feet from about the knee down.  She denies head trauma or pain elsewhere.  The patient states her wrist was quite crooked after the injury.    Allergies   Allergen Reactions    Azithromycin     Erythromycin Itching    Penicillins     Sulfamethoxazole-Trimethoprim Rash       Medications:   Home Medications:  Medications  Prior to Admission   Medication Sig Dispense Refill Last Dose/Taking    buPROPion XL (WELLBUTRIN XL) 300 MG 24 hr tablet Take 1 tablet by mouth Daily.   10/22/2024    topiramate (TOPAMAX) 50 MG tablet Take 1 tablet by mouth 2 (Two) Times a Day.   10/22/2024    Gpeaagj-Yugye-Sinaqiah-TenofAF (GENVOYA) 100-207-594-10 MG tablet Take 1 capsule by mouth Daily.       gabapentin (NEURONTIN) 300 MG capsule Take 300 mg by mouth 2 (Two) Times a Day.       loperamide (IMODIUM) 2 MG capsule Take 2 mg by mouth 4 (Four) Times a Day As Needed for Diarrhea.       Multiple Vitamins-Minerals (MULTIVITAMIN WITH MINERALS) tablet tablet Take 1 tablet by mouth Daily.          Current Medications:  Scheduled Meds:nicotine, 1 patch, Transdermal, Q24H      Continuous Infusions:   PRN Meds:.  HYDROcodone-acetaminophen    [COMPLETED] Insert Peripheral IV **AND** sodium chloride       Past Medical History:   Diagnosis Date    Abnormal blood chemistry     Allergic rhinitis     Anxiety     Arthritis     Blood clotting disorder     Bronchitis     CAD (coronary artery disease)     CHF (congestive heart failure)     Colon cancer 2012    Complete heart block 1999    [employer] pacemaker, battery changed in 2009    Contusion of left shoulder     COPD (chronic obstructive pulmonary disease)     Crohn's disease     Depression     DVT (deep venous thrombosis) 2005    Dyspareunia, female     Factor V Leiden     Fracture of pubic ramus     Hip fracture     History of prior pregnancies     X2    HIV disease     Hypothyroidism     IBS (irritable bowel syndrome)     Lumbar strain     Migraine     Peripheral neuropathy     Pilonidal cyst     Pulmonary embolus 1998    Rectal cancer 2003    Skin cancer     STD (female)         Past Surgical History:   Procedure Laterality Date    LUNG SURGERY      PACEMAKER IMPLANTATION  2017    PILONIDAL CYSTECTOMY      RECTAL SURGERY      destruction of rectal tumor    THYROIDECTOMY, PARTIAL      TONSILLECTOMY          Social  History     Occupational History     Employer: RETIRED   Tobacco Use    Smoking status: Every Day     Current packs/day: 1.00     Types: Cigarettes    Smokeless tobacco: Never   Vaping Use    Vaping status: Never Used   Substance and Sexual Activity    Alcohol use: Yes    Drug use: No    Sexual activity: Defer      Social History     Social History Narrative    Not on file        Family History   Problem Relation Age of Onset    Asthma Mother     Bleeding Disorder Mother     Bleeding Disorder Father     Stroke Father     Hyperlipidemia Father     Heart disease Father     Skin cancer Father     Alcohol abuse Maternal Grandmother     Arthritis Maternal Grandfather          Review of Systems:   HEENT: Patient denies any headaches, vision changes, change in hearing, or tinnitus, Patient denies any rhinorrhea, epistaxis, sinus pain, mouth or dental problems, sore throat or hoarseness, or dysphagia  Pulmonary: Patient denies any cough, congestion, SOA, or wheezing  Cardiovascular: Patient denies any chest pain, dyspnea, palpitations, weakness, intolerance of exercise, varicosities, swelling of extremities, known murmur  Gastrointestinal:  Patient denies nausea, vomiting, diarrhea, constipation, loss of appetite, change in appetite, dysphagia, gas, heartburn, melena, change in bowel habits, use of laxatives or other drugs to alter the function of the gastrointestinal tract.  Genital/Urinary: Patient denies dysuria, change in color of urine, change in frequency of urination, pain with urgency, incontinence, retention, or nocturia.  Musculoskeletal: Patient denies increased warmth; redness; or swelling of joints; limitation of function; deformity; crepitation: notes pain in left knee and wrist as documented above in HPI.  Denies pain in low back or neck.    Neurological: Patient denies dizziness, tremor, ataxia, difficulty in speaking, change in speech, paresthesia, loss of sensation, seizures, syncope, changes in  "memory.  Endocrine system: Patient denies tremors, palpitations, intolerance of heat or cold, polyuria, polydipsia, polyphagia, diaphoresis, exophthalmos, or goiter.  Psychological: Patient denies thoughts/plans of harming self or other; depression, insomnia, night terrors, sonia, memory loss, disorientation.  Skin: Patient denies any bruising, rashes, discoloration, pruritus, wounds, ulcers, decubiti, changes in the hair or nails  Hematopoietic: Patient denies history of spontaneous or excessive bleeding, epistaxis, hematuria, melena, fatigue, enlarged or tender lymph nodes, pallor, history of anemia.    Physical Exam: 59 y.o. female  Vitals:    10/22/24 1217 10/22/24 1637   BP: 127/82 158/70   BP Location: Right arm Right arm   Patient Position: Sitting Sitting   Pulse: 67 67   Resp: 16 16   Temp: 97.2 °F (36.2 °C) 97.8 °F (36.6 °C)   TempSrc: Oral Oral   SpO2: 98% 100%   Weight: 60.8 kg (134 lb) 60.8 kg (134 lb)   Height: 167.6 cm (66\") 167.6 cm (65.98\")     General: No acute distress  Pulmonary: Unlabored breathing  Cardiovascular: regular rate and rhythm  Left upper extremity  brisk capillary refill  Sensation normal  No signs of DVT compartment syndrome or acute carpal tunnel syndrome  Able to weakly flex and extend all 4 fingers and thumb  Left lower extremity  Sensation diminished from mid shins down (baseline neuropathy)  2+ dorsalis pedis and posterior tibial pulse  5 out of 5 ankle plantarflexion dorsiflexion inversion and eversion  Dressing clean dry and intact  No signs of DVT or compartment syndrome  Able to Perform straight leg raise  Point maximal tenderness mid patella      Diagnostic Tests:  Admission on 10/22/2024   Component Date Value Ref Range Status    Glucose 10/22/2024 82  65 - 99 mg/dL Final    BUN 10/22/2024 18  6 - 20 mg/dL Final    Creatinine 10/22/2024 1.05 (H)  0.57 - 1.00 mg/dL Final    Sodium 10/22/2024 138  136 - 145 mmol/L Final    Potassium 10/22/2024 4.1  3.5 - 5.2 mmol/L Final "    Chloride 10/22/2024 107  98 - 107 mmol/L Final    CO2 10/22/2024 23.0  22.0 - 29.0 mmol/L Final    Calcium 10/22/2024 10.5  8.6 - 10.5 mg/dL Final    Total Protein 10/22/2024 6.9  6.0 - 8.5 g/dL Final    Albumin 10/22/2024 4.0  3.5 - 5.2 g/dL Final    ALT (SGPT) 10/22/2024 11  1 - 33 U/L Final    AST (SGOT) 10/22/2024 24  1 - 32 U/L Final    Alkaline Phosphatase 10/22/2024 54  39 - 117 U/L Final    Total Bilirubin 10/22/2024 0.3  0.0 - 1.2 mg/dL Final    Globulin 10/22/2024 2.9  gm/dL Final    A/G Ratio 10/22/2024 1.4  g/dL Final    BUN/Creatinine Ratio 10/22/2024 17.1  7.0 - 25.0 Final    Anion Gap 10/22/2024 8.0  5.0 - 15.0 mmol/L Final    eGFR 10/22/2024 61.3  >60.0 mL/min/1.73 Final    Protime 10/22/2024 14.7  12.1 - 15.0 Seconds Final    INR 10/22/2024 1.11 (H)  0.90 - 1.10 Final    PTT 10/22/2024 33.3  24.3 - 38.1 seconds Final    WBC 10/22/2024 6.04  3.40 - 10.80 10*3/mm3 Final    RBC 10/22/2024 3.76 (L)  3.77 - 5.28 10*6/mm3 Final    Hemoglobin 10/22/2024 12.2  12.0 - 15.9 g/dL Final    Hematocrit 10/22/2024 37.8  34.0 - 46.6 % Final    MCV 10/22/2024 100.5 (H)  79.0 - 97.0 fL Final    MCH 10/22/2024 32.4  26.6 - 33.0 pg Final    MCHC 10/22/2024 32.3  31.5 - 35.7 g/dL Final    RDW 10/22/2024 12.9  12.3 - 15.4 % Final    RDW-SD 10/22/2024 47.7  37.0 - 54.0 fl Final    MPV 10/22/2024 9.8  6.0 - 12.0 fL Final    Platelets 10/22/2024 178  140 - 450 10*3/mm3 Final    Neutrophil % 10/22/2024 66.8  42.7 - 76.0 % Final    Lymphocyte % 10/22/2024 21.7  19.6 - 45.3 % Final    Monocyte % 10/22/2024 7.6  5.0 - 12.0 % Final    Eosinophil % 10/22/2024 2.5  0.3 - 6.2 % Final    Basophil % 10/22/2024 1.2  0.0 - 1.5 % Final    Immature Grans % 10/22/2024 0.2  0.0 - 0.5 % Final    Neutrophils, Absolute 10/22/2024 4.04  1.70 - 7.00 10*3/mm3 Final    Lymphocytes, Absolute 10/22/2024 1.31  0.70 - 3.10 10*3/mm3 Final    Monocytes, Absolute 10/22/2024 0.46  0.10 - 0.90 10*3/mm3 Final    Eosinophils, Absolute 10/22/2024 0.15   0.00 - 0.40 10*3/mm3 Final    Basophils, Absolute 10/22/2024 0.07  0.00 - 0.20 10*3/mm3 Final    Immature Grans, Absolute 10/22/2024 0.01  0.00 - 0.05 10*3/mm3 Final    nRBC 10/22/2024 0.0  0.0 - 0.2 /100 WBC Final    Extra Tube 10/22/2024 Hold for add-ons.   Final    Auto resulted.    Extra Tube 10/22/2024 hold for add-on   Final    Auto resulted    Extra Tube 10/22/2024 Hold for add-ons.   Final    Auto resulted     XR Knee 3 View Left    Result Date: 10/22/2024  Impression: Impression: 1.Evidence for a potential longitudinal nondisplaced fracture of the patella. Recommend correlation for point tenderness localizing to the patella. 2.The articulations of the knee remain intact without dislocation. 3.There is no joint effusion. Electronically Signed: Peng Erwin MD  10/22/2024 1:02 PM EDT  Workstation ID: [id number]    XR Forearm 2 View Left    Result Date: 10/22/2024  Impression: Impression: Comminuted impaction fracture involving the distal radius. Electronically Signed: David Savage MD  10/22/2024 1:01 PM EDT  Workstation ID: [id number]    XR Hand 3+ View Left    Result Date: 10/22/2024  Impression: Impression: Comminuted impaction fracture involving the distal radius. Electronically Signed: David Savage MD  10/22/2024 1:01 PM EDT  Workstation ID: [id number]    XR Wrist 3+ View Left    Result Date: 10/22/2024  Impression: Impression: 1.Transverse impacted fracture involving the distal metaphysis of the radius. There is mild dorsal angulation of the distal fracture fragment. 2.There may be a nondisplaced transverse fracture of the distal ulna as well. 3.The articulations of the wrist remain intact without dislocation. Electronically Signed: Peng Erwin MD  10/22/2024 12:59 PM EDT  Workstation ID: [id number]     Assessment:    Closed fracture of left distal radius    Distal radial fracture           Plan:    She has sustained a displaced left distal radius fracture and is interested in proceeding with open  reduction and internal fixation possible DRUJ stabilization and all associated procedures.    The goals, indications, risks, and complications of the procedure were discussed with the patient. Specifically, I discussed the expectations for postoperative recovery including 4 to 6 weeks of immobilization and potential need for occupational therapy. I also discussed the risks of the procedure, including stiffness, fracture, implant loosening, implant failure, nonunion or malunion, venous thromboembolism, infection, nerve palsy, vascular injury, EPL rupture, stiffness, need for more procedures and the risks of anesthesia. I also explained that she would meet with Anesthesiology preoperatively to discuss anesthetic risk.  All questions were answered.     Plan for left distal radius open reduction and internal fixation possible DRUJ stabilization and all associated procedures.    Implants: [employer] Distal Radius Plating system  Antibiotics: Cefazolin  Admission Type: 23 HR Obs    Patient she is able to perform straight leg raise and she has a longitudinal patella fracture.  She has no significant effusion I discussed with her that I would recommend weightbearing as tolerated in knee immobilizer and nonoperative treatment for her patella.      Karl Powell MD      Dictated utilizing Dragon dictation

## 2024-10-22 NOTE — PLAN OF CARE
Goal Outcome Evaluation:  Plan of Care Reviewed With: patient        Progress: no change  Outcome Evaluation: NP ER Admit after a fall at home. Very independant. NPO at midnight, surgery 10/23/24. Smoker. patch on L shoulder. explained non-smoking policy. Left wrist splint ace and sling, Left Patella in brace. will continue to monitor.

## 2024-10-22 NOTE — ED NOTES
"Pt requested to go to car \" to get a phone \". Instructed pt that she has 2 fractures and it is unsafe for her to walk out of ER. Pt argued \" I'll be right back\". Explained to pt 3 times that she is the ER depts responsibility and she is high risk for a fall with known fractures. I offered to call security to escort pt to car. They declined and stated they are unable to do so. Pt then stopped a staff member and wanted to go to car to smoke. Primary nurse and Agustin notified  "

## 2024-10-22 NOTE — ED PROVIDER NOTES
Subjective   History of Present Illness  59-year-old  female patient presented to the emergency department via private vehicle with a chief complaint of fall with injury.  Patient states she was cleaning her fish tank while standing on a stepstool.  She states she fell backwards off the stepstool.  She states that her left hand/wrist/forearm fell and hit the edge of the bucket.  She states she was try to catch herself.  She states she landed oddly on her left hand/wrist/forearm and left knee.  She states she is having significant pain to the left wrist and left knee.  She states she is severely osteopenic/osteoporosis.  She states that she is at very high risk for fracture.  Patient states she did not take any medications prior to her arrival.    History provided by:  Medical records and patient      Review of Systems   Constitutional: Negative.    HENT: Negative.     Respiratory: Negative.     Cardiovascular: Negative.    Gastrointestinal: Negative.    Genitourinary: Negative.    Musculoskeletal:  Positive for arthralgias, gait problem, joint swelling and myalgias. Negative for back pain, neck pain and neck stiffness.   Skin: Negative.    Psychiatric/Behavioral: Negative.     All other systems reviewed and are negative.      Past Medical History:   Diagnosis Date    Abnormal blood chemistry     Allergic rhinitis     Anxiety     Arthritis     Blood clotting disorder     Bronchitis     CAD (coronary artery disease)     CHF (congestive heart failure)     Colon cancer 2012    Complete heart block 1999    [employer] pacemaker, battery changed in 2009    Contusion of left shoulder     COPD (chronic obstructive pulmonary disease)     Crohn's disease     Depression     DVT (deep venous thrombosis) 2005    Dyspareunia, female     Factor V Leiden     Fracture of pubic ramus     Hip fracture     History of prior pregnancies     X2    HIV disease     Hypothyroidism     IBS (irritable bowel syndrome)     Lumbar strain      Migraine     Peripheral neuropathy     Pilonidal cyst     Pulmonary embolus 1998    Rectal cancer 2003    Skin cancer     STD (female)        Allergies   Allergen Reactions    Azithromycin     Erythromycin Itching    Penicillins     Sulfamethoxazole-Trimethoprim Rash       Past Surgical History:   Procedure Laterality Date    LUNG SURGERY      PACEMAKER IMPLANTATION  2017    PILONIDAL CYSTECTOMY      RECTAL SURGERY      destruction of rectal tumor    THYROIDECTOMY, PARTIAL      TONSILLECTOMY         Family History   Problem Relation Age of Onset    Asthma Mother     Bleeding Disorder Mother     Bleeding Disorder Father     Stroke Father     Hyperlipidemia Father     Heart disease Father     Skin cancer Father     Alcohol abuse Maternal Grandmother     Arthritis Maternal Grandfather        Social History     Socioeconomic History    Marital status:      Spouse name: Conner    Number of children: 0    Years of education: College   Tobacco Use    Smoking status: Every Day     Current packs/day: 1.00     Types: Cigarettes    Smokeless tobacco: Never   Vaping Use    Vaping status: Never Used   Substance and Sexual Activity    Alcohol use: Yes    Drug use: No           Objective   Physical Exam  Vitals and nursing note reviewed.   Constitutional:       General: She is in acute distress.      Appearance: Normal appearance. She is normal weight. She is not ill-appearing, toxic-appearing or diaphoretic.   HENT:      Head: Normocephalic and atraumatic.      Right Ear: External ear normal.      Left Ear: External ear normal.      Nose: Nose normal.      Mouth/Throat:      Mouth: Mucous membranes are moist.      Pharynx: Oropharynx is clear.   Eyes:      Extraocular Movements: Extraocular movements intact.      Conjunctiva/sclera: Conjunctivae normal.      Pupils: Pupils are equal, round, and reactive to light.   Cardiovascular:      Rate and Rhythm: Normal rate and regular rhythm.      Pulses: Normal pulses.       Heart sounds: Normal heart sounds.   Pulmonary:      Effort: Pulmonary effort is normal.      Breath sounds: Normal breath sounds.   Abdominal:      General: Bowel sounds are normal.      Palpations: Abdomen is soft.   Musculoskeletal:         General: Swelling, tenderness and signs of injury present.      Left forearm: Tenderness present.      Left wrist: Swelling, deformity, tenderness, bony tenderness and snuff box tenderness present. No lacerations. Decreased range of motion. Normal pulse.      Left hand: Tenderness and bony tenderness present. Decreased range of motion.      Cervical back: Normal range of motion and neck supple.      Left knee: Swelling and bony tenderness present. No ecchymosis or lacerations. Decreased range of motion. Tenderness present over the medial joint line, lateral joint line and patellar tendon.   Skin:     General: Skin is warm and dry.      Capillary Refill: Capillary refill takes less than 2 seconds.   Neurological:      General: No focal deficit present.      Mental Status: She is alert and oriented to person, place, and time.   Psychiatric:         Mood and Affect: Mood normal.         Behavior: Behavior normal.         Thought Content: Thought content normal.         Judgment: Judgment normal.         Splint - Cast - Strapping    Date/Time: 10/22/2024 2:38 PM    Performed by: Wing Deleon APRN  Authorized by: Jean Claude Torres MD    Consent:     Consent obtained:  Verbal    Consent given by:  Patient    Risks, benefits, and alternatives were discussed: yes      Risks discussed:  Discoloration, numbness, pain and swelling    Alternatives discussed:  No treatment, delayed treatment, alternative treatment, observation and referral  Universal protocol:     Procedure explained and questions answered to patient or proxy's satisfaction: yes      Relevant documents present and verified: yes      Test results available: yes      Imaging studies available: yes      Required blood  products, implants, devices, and special equipment available: yes      Site/side marked: yes      Immediately prior to procedure a time out was called: yes      Patient identity confirmed:  Verbally with patient, arm band and hospital-assigned identification number  Pre-procedure details:     Distal neurologic exam:  Normal    Distal perfusion: distal pulses strong and brisk capillary refill    Procedure details:     Location:  Wrist    Wrist location:  L wrist    Cast type:  Short arm    Splint type:  Volar short arm    Supplies:  Cotton padding, elastic bandage and fiberglass    Attestation: Splint applied and adjusted personally by me    Post-procedure details:     Distal neurologic exam:  Normal    Distal perfusion: distal pulses strong and brisk capillary refill      Procedure completion:  Tolerated    Post-procedure imaging: not applicable               ED Course  ED Course as of 10/22/24 1512   Tue Oct 22, 2024   1325 Spoke with Dr. Powell discussed the patient.  He will review images and discussed with ORs scheduling team to see if they can accommodate repair tomorrow. [RC]      ED Course User Index  [RC] Wing Deleon, SWATHI                                               Medical Decision Making  Differential diagnosis includes abrasion, contusion, laceration, fracture, dislocation, sprain, strain, and soft tissue injury.    XR Knee 3 View Left    Result Date: 10/22/2024  Impression: 1.Evidence for a potential longitudinal nondisplaced fracture of the patella. Recommend correlation for point tenderness localizing to the patella. 2.The articulations of the knee remain intact without dislocation. 3.There is no joint effusion. Electronically Signed: Peng Erwin MD  10/22/2024 1:02 PM EDT  Workstation ID: [id number]    XR Forearm 2 View Left    Result Date: 10/22/2024  Impression: Comminuted impaction fracture involving the distal radius. Electronically Signed: David Savage MD  10/22/2024 1:01 PM EDT   Workstation ID: [id number]    XR Hand 3+ View Left    Result Date: 10/22/2024  Impression: Comminuted impaction fracture involving the distal radius. Electronically Signed: David Savage MD  10/22/2024 1:01 PM EDT  Workstation ID: [id number]    XR Wrist 3+ View Left    Result Date: 10/22/2024  Impression: 1.Transverse impacted fracture involving the distal metaphysis of the radius. There is mild dorsal angulation of the distal fracture fragment. 2.There may be a nondisplaced transverse fracture of the distal ulna as well. 3.The articulations of the wrist remain intact without dislocation. Electronically Signed: Peng Erwin MD  10/22/2024 12:59 PM EDT  Workstation ID: [id number]      Labs Reviewed  COMPREHENSIVE METABOLIC PANEL - Abnormal; Notable for the following components:     Creatinine                    1.05 (*)            All other components within normal limits         Narrative: GFR Normal >60                  Chronic Kidney Disease <60                  Kidney Failure <15                    PROTIME-INR - Abnormal; Notable for the following components:     INR                           1.11 (*)            All other components within normal limits         Narrative: Therapeutic Ranges for INR: 2.0-3.0 (PT 20-30)                                              2.5-3.5 (PT 25-34)  CBC WITH AUTO DIFFERENTIAL - Abnormal; Notable for the following components:     RBC                           3.76 (*)               MCV                           100.5 (*)            All other components within normal limits  APTT - Normal         Narrative: PTT = The equivalent PTT values for the therapeutic range of heparin levels at 0.1 to 0.7 U/ml are 53 to 110 seconds.                    RAINBOW DRAW         Narrative: The following orders were created for panel order [place] Draw.                  Procedure                               Abnormality         Status                                     ---------                                -----------         ------                                     Green Top (Gel)[[id number]]                                  Final result                               Lavender Top[[id number]]                                     Final result                               Gold Top - SST[[id number]]                                                                              Light Blue Top[[id number]]                                   Final result                                                 Please view results for these tests on the individual orders.  CBC AND DIFFERENTIAL         Narrative: The following orders were created for panel order CBC & Differential.                  Procedure                               Abnormality         Status                                     ---------                               -----------         ------                                     CBC Auto Differential[[id number]]        Abnormal            Final result                                                 Please view results for these tests on the individual orders.  GREEN TOP  LAVENDER TOP  LIGHT BLUE TOP  GOLD TOP - SST     Discussed imaging, laboratory and assessment findings.  Patient has a distal left radius and ulnar fracture.  She also has a patellar fracture of the left knee.  Spoke with orthopedics and discussed the patient.  They recommend patient be admitted for surgical intervention tomorrow.  Patient was placed in a knee immobilizer.  A short arm volar splint was applied to the left hand wrist.  Called and assessed the patient with Dr. Xavier.  He will accept the patient for admission.  Patient understands and agrees to the admission plan.    Problems Addressed:  Closed nondisplaced fracture of left patella, unspecified fracture morphology, initial encounter: complicated acute illness or injury  Other closed extra-articular fracture of distal end of left radius, initial encounter: complicated acute illness  or injury  Other closed fracture of distal end of left ulna, initial encounter: complicated acute illness or injury    Amount and/or Complexity of Data Reviewed  Labs: ordered. Decision-making details documented in ED Course.  Radiology: ordered. Decision-making details documented in ED Course.    Risk  OTC drugs.  Prescription drug management.  Decision regarding hospitalization.        Final diagnoses:   Closed nondisplaced fracture of left patella, unspecified fracture morphology, initial encounter   Other closed extra-articular fracture of distal end of left radius, initial encounter   Other closed fracture of distal end of left ulna, initial encounter       ED Disposition  ED Disposition       ED Disposition   Decision to Admit    Condition   --    Comment   Level of Care: Med/Surg [1]   Diagnosis: Distal radial fracture [497292]   Admitting Physician: RENITA BLOOM [[id number]]   Attending Physician: RENITA BLOOM [[id number]]   Bed Request Comments: Medical surgical                 No follow-up provider specified.       Medication List      No changes were made to your prescriptions during this visit.            Wing Deleon, APRN  10/22/24 6402

## 2024-10-23 ENCOUNTER — APPOINTMENT (OUTPATIENT)
Dept: GENERAL RADIOLOGY | Facility: HOSPITAL | Age: 59
End: 2024-10-23
Payer: MEDICARE

## 2024-10-23 ENCOUNTER — ANESTHESIA (OUTPATIENT)
Dept: PERIOP | Facility: HOSPITAL | Age: 59
End: 2024-10-23
Payer: MEDICARE

## 2024-10-23 ENCOUNTER — ANESTHESIA EVENT (OUTPATIENT)
Dept: PERIOP | Facility: HOSPITAL | Age: 59
End: 2024-10-23
Payer: MEDICARE

## 2024-10-23 VITALS
HEART RATE: 60 BPM | WEIGHT: 134 LBS | SYSTOLIC BLOOD PRESSURE: 100 MMHG | TEMPERATURE: 97.1 F | RESPIRATION RATE: 16 BRPM | BODY MASS INDEX: 21.53 KG/M2 | HEIGHT: 66 IN | DIASTOLIC BLOOD PRESSURE: 58 MMHG | OXYGEN SATURATION: 98 %

## 2024-10-23 PROCEDURE — G0378 HOSPITAL OBSERVATION PER HR: HCPCS

## 2024-10-23 PROCEDURE — 25810000003 LACTATED RINGERS PER 1000 ML: Performed by: NURSE ANESTHETIST, CERTIFIED REGISTERED

## 2024-10-23 PROCEDURE — 96375 TX/PRO/DX INJ NEW DRUG ADDON: CPT

## 2024-10-23 PROCEDURE — 73110 X-RAY EXAM OF WRIST: CPT

## 2024-10-23 PROCEDURE — C1713 ANCHOR/SCREW BN/BN,TIS/BN: HCPCS | Performed by: INTERNAL MEDICINE

## 2024-10-23 PROCEDURE — 25010000002 ONDANSETRON PER 1 MG: Performed by: NURSE ANESTHETIST, CERTIFIED REGISTERED

## 2024-10-23 PROCEDURE — A9270 NON-COVERED ITEM OR SERVICE: HCPCS | Performed by: INTERNAL MEDICINE

## 2024-10-23 PROCEDURE — 25010000002 ONDANSETRON PER 1 MG: Performed by: INTERNAL MEDICINE

## 2024-10-23 PROCEDURE — 25010000002 LIDOCAINE 2% SOLUTION: Performed by: NURSE ANESTHETIST, CERTIFIED REGISTERED

## 2024-10-23 PROCEDURE — 76000 FLUOROSCOPY <1 HR PHYS/QHP: CPT

## 2024-10-23 PROCEDURE — 25607 OPTX DST RD XARTC FX/EPI SEP: CPT | Performed by: INTERNAL MEDICINE

## 2024-10-23 PROCEDURE — 63710000001 POVIDONE-IODINE 10 % SOLUTION 118 ML BOTTLE: Performed by: INTERNAL MEDICINE

## 2024-10-23 PROCEDURE — 25010000002 CEFAZOLIN PER 500 MG: Performed by: INTERNAL MEDICINE

## 2024-10-23 PROCEDURE — 25010000002 FENTANYL CITRATE (PF) 50 MCG/ML SOLUTION: Performed by: NURSE ANESTHETIST, CERTIFIED REGISTERED

## 2024-10-23 PROCEDURE — 25010000002 DEXAMETHASONE PER 1 MG: Performed by: NURSE ANESTHETIST, CERTIFIED REGISTERED

## 2024-10-23 PROCEDURE — 25010000002 MIDAZOLAM PER 1MG: Performed by: NURSE ANESTHETIST, CERTIFIED REGISTERED

## 2024-10-23 PROCEDURE — 63710000001 ACETAMINOPHEN EXTRA STRENGTH 500 MG TABLET: Performed by: NURSE ANESTHETIST, CERTIFIED REGISTERED

## 2024-10-23 PROCEDURE — 25010000002 PROPOFOL 10 MG/ML EMULSION: Performed by: NURSE ANESTHETIST, CERTIFIED REGISTERED

## 2024-10-23 PROCEDURE — 25607 OPTX DST RD XARTC FX/EPI SEP: CPT | Performed by: SPECIALIST/TECHNOLOGIST, OTHER

## 2024-10-23 PROCEDURE — 63710000001 OXYCODONE-ACETAMINOPHEN 10-325 MG TABLET: Performed by: INTERNAL MEDICINE

## 2024-10-23 PROCEDURE — 93010 ELECTROCARDIOGRAM REPORT: CPT | Performed by: INTERNAL MEDICINE

## 2024-10-23 PROCEDURE — 25010000002 ONDANSETRON PER 1 MG: Performed by: HOSPITALIST

## 2024-10-23 PROCEDURE — A9270 NON-COVERED ITEM OR SERVICE: HCPCS | Performed by: NURSE ANESTHETIST, CERTIFIED REGISTERED

## 2024-10-23 PROCEDURE — 96374 THER/PROPH/DIAG INJ IV PUSH: CPT

## 2024-10-23 DEVICE — SCRW CORT GEMINUS LK TI 3.5X13MM: Type: IMPLANTABLE DEVICE | Site: WRIST | Status: FUNCTIONAL

## 2024-10-23 DEVICE — PEG GEMINUS THRD LK TI 2.3X19MM: Type: IMPLANTABLE DEVICE | Site: WRIST | Status: FUNCTIONAL

## 2024-10-23 DEVICE — PEG GEMINUS THRD LK TI 2.3X18MM: Type: IMPLANTABLE DEVICE | Site: WRIST | Status: FUNCTIONAL

## 2024-10-23 DEVICE — PEG THRD GEMINUS LK TI 2.3X17MM: Type: IMPLANTABLE DEVICE | Site: WRIST | Status: FUNCTIONAL

## 2024-10-23 DEVICE — SCRW GEMINUS PA NL 3TI .5X13MM: Type: IMPLANTABLE DEVICE | Site: WRIST | Status: FUNCTIONAL

## 2024-10-23 DEVICE — PLT DIST/RAD GEMINUS NRW 3HL LT: Type: IMPLANTABLE DEVICE | Site: WRIST | Status: FUNCTIONAL

## 2024-10-23 DEVICE — SCRW CORT GEMINUS LK TI 3.5X12MM: Type: IMPLANTABLE DEVICE | Site: WRIST | Status: FUNCTIONAL

## 2024-10-23 RX ORDER — FENTANYL CITRATE 50 UG/ML
25 INJECTION, SOLUTION INTRAMUSCULAR; INTRAVENOUS
Status: COMPLETED | OUTPATIENT
Start: 2024-10-23 | End: 2024-10-23

## 2024-10-23 RX ORDER — ONDANSETRON 4 MG/1
4 TABLET, FILM COATED ORAL EVERY 8 HOURS PRN
Qty: 10 TABLET | Refills: 0 | Status: SHIPPED | OUTPATIENT
Start: 2024-10-23

## 2024-10-23 RX ORDER — ONDANSETRON 2 MG/ML
4 INJECTION INTRAMUSCULAR; INTRAVENOUS ONCE AS NEEDED
Status: DISCONTINUED | OUTPATIENT
Start: 2024-10-23 | End: 2024-10-23 | Stop reason: HOSPADM

## 2024-10-23 RX ORDER — SODIUM CHLORIDE, SODIUM LACTATE, POTASSIUM CHLORIDE, CALCIUM CHLORIDE 600; 310; 30; 20 MG/100ML; MG/100ML; MG/100ML; MG/100ML
100 INJECTION, SOLUTION INTRAVENOUS ONCE
Status: DISCONTINUED | OUTPATIENT
Start: 2024-10-23 | End: 2024-10-23 | Stop reason: HOSPADM

## 2024-10-23 RX ORDER — FAMOTIDINE 20 MG/1
20 TABLET, FILM COATED ORAL
Status: COMPLETED | OUTPATIENT
Start: 2024-10-23 | End: 2024-10-23

## 2024-10-23 RX ORDER — DEXAMETHASONE SODIUM PHOSPHATE 4 MG/ML
4 INJECTION, SOLUTION INTRA-ARTICULAR; INTRALESIONAL; INTRAMUSCULAR; INTRAVENOUS; SOFT TISSUE ONCE AS NEEDED
Status: COMPLETED | OUTPATIENT
Start: 2024-10-23 | End: 2024-10-23

## 2024-10-23 RX ORDER — ONDANSETRON 2 MG/ML
4 INJECTION INTRAMUSCULAR; INTRAVENOUS ONCE AS NEEDED
Status: COMPLETED | OUTPATIENT
Start: 2024-10-23 | End: 2024-10-23

## 2024-10-23 RX ORDER — LIDOCAINE HYDROCHLORIDE 20 MG/ML
INJECTION, SOLUTION INFILTRATION; PERINEURAL AS NEEDED
Status: DISCONTINUED | OUTPATIENT
Start: 2024-10-23 | End: 2024-10-23 | Stop reason: SURG

## 2024-10-23 RX ORDER — ACETAMINOPHEN 500 MG
1000 TABLET ORAL ONCE
Status: COMPLETED | OUTPATIENT
Start: 2024-10-23 | End: 2024-10-23

## 2024-10-23 RX ORDER — SODIUM CHLORIDE, SODIUM LACTATE, POTASSIUM CHLORIDE, CALCIUM CHLORIDE 600; 310; 30; 20 MG/100ML; MG/100ML; MG/100ML; MG/100ML
30 INJECTION, SOLUTION INTRAVENOUS CONTINUOUS
Status: DISCONTINUED | OUTPATIENT
Start: 2024-10-23 | End: 2024-10-23 | Stop reason: HOSPADM

## 2024-10-23 RX ORDER — FENTANYL CITRATE 50 UG/ML
INJECTION, SOLUTION INTRAMUSCULAR; INTRAVENOUS AS NEEDED
Status: DISCONTINUED | OUTPATIENT
Start: 2024-10-23 | End: 2024-10-23 | Stop reason: SURG

## 2024-10-23 RX ORDER — MORPHINE SULFATE 2 MG/ML
2 INJECTION, SOLUTION INTRAMUSCULAR; INTRAVENOUS EVERY 4 HOURS PRN
Status: DISCONTINUED | OUTPATIENT
Start: 2024-10-23 | End: 2024-10-23 | Stop reason: HOSPADM

## 2024-10-23 RX ORDER — ACETAMINOPHEN 650 MG/1
650 SUPPOSITORY RECTAL EVERY 4 HOURS PRN
Status: DISCONTINUED | OUTPATIENT
Start: 2024-10-23 | End: 2024-10-23

## 2024-10-23 RX ORDER — DEXMEDETOMIDINE HYDROCHLORIDE 100 UG/ML
INJECTION, SOLUTION INTRAVENOUS AS NEEDED
Status: DISCONTINUED | OUTPATIENT
Start: 2024-10-23 | End: 2024-10-23 | Stop reason: SURG

## 2024-10-23 RX ORDER — NALOXONE HCL 0.4 MG/ML
0.4 VIAL (ML) INJECTION
Status: DISCONTINUED | OUTPATIENT
Start: 2024-10-23 | End: 2024-10-23 | Stop reason: HOSPADM

## 2024-10-23 RX ORDER — OXYCODONE AND ACETAMINOPHEN 5; 325 MG/1; MG/1
1 TABLET ORAL EVERY 4 HOURS PRN
Status: DISCONTINUED | OUTPATIENT
Start: 2024-10-23 | End: 2024-10-23 | Stop reason: HOSPADM

## 2024-10-23 RX ORDER — PROPOFOL 10 MG/ML
VIAL (ML) INTRAVENOUS AS NEEDED
Status: DISCONTINUED | OUTPATIENT
Start: 2024-10-23 | End: 2024-10-23 | Stop reason: SURG

## 2024-10-23 RX ORDER — OXYCODONE AND ACETAMINOPHEN 7.5; 325 MG/1; MG/1
1 TABLET ORAL EVERY 6 HOURS PRN
Qty: 21 TABLET | Refills: 0 | Status: SHIPPED | OUTPATIENT
Start: 2024-10-23

## 2024-10-23 RX ORDER — EPHEDRINE SULFATE 50 MG/ML
INJECTION INTRAVENOUS AS NEEDED
Status: DISCONTINUED | OUTPATIENT
Start: 2024-10-23 | End: 2024-10-23 | Stop reason: SURG

## 2024-10-23 RX ORDER — MIDAZOLAM HYDROCHLORIDE 2 MG/2ML
1 INJECTION, SOLUTION INTRAMUSCULAR; INTRAVENOUS
Status: DISCONTINUED | OUTPATIENT
Start: 2024-10-23 | End: 2024-10-23 | Stop reason: HOSPADM

## 2024-10-23 RX ORDER — FAMOTIDINE 10 MG/ML
20 INJECTION, SOLUTION INTRAVENOUS
Status: COMPLETED | OUTPATIENT
Start: 2024-10-23 | End: 2024-10-23

## 2024-10-23 RX ORDER — ONDANSETRON 2 MG/ML
4 INJECTION INTRAMUSCULAR; INTRAVENOUS EVERY 6 HOURS PRN
Status: DISCONTINUED | OUTPATIENT
Start: 2024-10-23 | End: 2024-10-23 | Stop reason: HOSPADM

## 2024-10-23 RX ORDER — ACETAMINOPHEN 325 MG/1
650 TABLET ORAL EVERY 6 HOURS PRN
Status: DISCONTINUED | OUTPATIENT
Start: 2024-10-23 | End: 2024-10-23

## 2024-10-23 RX ORDER — OXYCODONE AND ACETAMINOPHEN 10; 325 MG/1; MG/1
1 TABLET ORAL EVERY 4 HOURS PRN
Status: DISCONTINUED | OUTPATIENT
Start: 2024-10-23 | End: 2024-10-23 | Stop reason: HOSPADM

## 2024-10-23 RX ADMIN — FENTANYL CITRATE 25 MCG: 50 INJECTION, SOLUTION INTRAMUSCULAR; INTRAVENOUS at 16:28

## 2024-10-23 RX ADMIN — FAMOTIDINE 20 MG: 10 INJECTION, SOLUTION INTRAVENOUS at 13:18

## 2024-10-23 RX ADMIN — FENTANYL CITRATE 25 MCG: 50 INJECTION, SOLUTION INTRAMUSCULAR; INTRAVENOUS at 16:02

## 2024-10-23 RX ADMIN — CEFAZOLIN 2000 MG: 2 INJECTION, POWDER, FOR SOLUTION INTRAVENOUS at 14:40

## 2024-10-23 RX ADMIN — MIDAZOLAM HYDROCHLORIDE 1 MG: 1 INJECTION, SOLUTION INTRAMUSCULAR; INTRAVENOUS at 13:31

## 2024-10-23 RX ADMIN — FENTANYL CITRATE 25 MCG: 50 INJECTION, SOLUTION INTRAMUSCULAR; INTRAVENOUS at 14:59

## 2024-10-23 RX ADMIN — ONDANSETRON 4 MG: 2 INJECTION INTRAMUSCULAR; INTRAVENOUS at 13:18

## 2024-10-23 RX ADMIN — LIDOCAINE HYDROCHLORIDE 60 MG: 20 INJECTION, SOLUTION INFILTRATION; PERINEURAL at 14:33

## 2024-10-23 RX ADMIN — DEXAMETHASONE SODIUM PHOSPHATE 4 MG: 4 INJECTION, SOLUTION INTRAMUSCULAR; INTRAVENOUS at 13:18

## 2024-10-23 RX ADMIN — EPHEDRINE SULFATE 5 MG: 50 INJECTION INTRAVENOUS at 14:48

## 2024-10-23 RX ADMIN — DEXMEDETOMIDINE 6 MCG: 100 INJECTION, SOLUTION INTRAVENOUS at 15:16

## 2024-10-23 RX ADMIN — ONDANSETRON 4 MG: 2 INJECTION INTRAMUSCULAR; INTRAVENOUS at 09:17

## 2024-10-23 RX ADMIN — FENTANYL CITRATE 25 MCG: 50 INJECTION, SOLUTION INTRAMUSCULAR; INTRAVENOUS at 16:16

## 2024-10-23 RX ADMIN — SODIUM CHLORIDE, POTASSIUM CHLORIDE, SODIUM LACTATE AND CALCIUM CHLORIDE 30 ML/HR: 600; 310; 30; 20 INJECTION, SOLUTION INTRAVENOUS at 13:18

## 2024-10-23 RX ADMIN — OXYCODONE AND ACETAMINOPHEN 1 TABLET: 10; 325 TABLET ORAL at 17:31

## 2024-10-23 RX ADMIN — FENTANYL CITRATE 25 MCG: 50 INJECTION, SOLUTION INTRAMUSCULAR; INTRAVENOUS at 14:56

## 2024-10-23 RX ADMIN — OXYCODONE HYDROCHLORIDE AND ACETAMINOPHEN 1 TABLET: 5; 325 TABLET ORAL at 09:17

## 2024-10-23 RX ADMIN — FENTANYL CITRATE 25 MCG: 50 INJECTION, SOLUTION INTRAMUSCULAR; INTRAVENOUS at 15:53

## 2024-10-23 RX ADMIN — ONDANSETRON 4 MG: 2 INJECTION INTRAMUSCULAR; INTRAVENOUS at 17:30

## 2024-10-23 RX ADMIN — PROPOFOL 150 MG: 10 INJECTION, EMULSION INTRAVENOUS at 14:33

## 2024-10-23 RX ADMIN — ACETAMINOPHEN 1000 MG: 500 TABLET ORAL at 13:18

## 2024-10-23 RX ADMIN — DEXMEDETOMIDINE 6 MCG: 100 INJECTION, SOLUTION INTRAVENOUS at 15:09

## 2024-10-23 NOTE — PROGRESS NOTES
"Hospitalist Team      Patient Care Team:  Naa Vale MD as PCP - General (Internal Medicine)  Peter Jones MD as Referring Physician (Family Medicine)  Matthew Frazier MD as Consulting Physician (Hematology and Oncology)  Camryn Romero MD as Consulting Physician (Hematology and Oncology)  Himanshu Luna MD as Consulting Physician (Hematology and Oncology)        Chief Complaint:  ***    Subjective        Objective    Vital Signs  Temp:  [97.1 °F (36.2 °C)-98.3 °F (36.8 °C)] 98.3 °F (36.8 °C)  Heart Rate:  [60-67] 62  Resp:  [16-17] 17  BP: ()/(54-82) 99/55  Oxygen Therapy  SpO2: 98 %  Pulse Oximetry Type: Intermittent  Device (Oxygen Therapy): room air}  Flowsheet Rows      Flowsheet Row First Filed Value   Admission Height 167.6 cm (66\") Documented at 10/22/2024 1217   Admission Weight 60.8 kg (134 lb) Documented at 10/22/2024 1217              Physical Exam:    General:  HEENT:  Lungs:  CV:  Abdomen:  MSK:  Skin:  Neuro:  Psych:    Results Review:     I reviewed the patient's new clinical results.    Lab Results (last 24 hours)       Procedure Component Value Units Date/Time    [place] Draw [[id number]] Collected: 10/22/24 1424    Specimen: Blood Updated: 10/22/24 2147    Narrative:      The following orders were created for panel order [place] Draw.  Procedure                               Abnormality         Status                     ---------                               -----------         ------                     Green Top (Gel)[[id number]]                                  Final result               Lavender Top[[id number]]                                     Final result               Gold Top - SST[[id number]]                                   Final result               Light Blue Top[[id number]]                                   Final result                 Please view results for these tests on the individual orders.    Gold Top - SST [[id number]] Collected: 10/22/24 1424    " Specimen: Blood Updated: 10/22/24 2147     Extra Tube Hold for add-ons.     Comment: Auto resulted.       Comprehensive Metabolic Panel [[id number]]  (Abnormal) Collected: 10/22/24 1424    Specimen: Blood Updated: 10/22/24 1454     Glucose 82 mg/dL      BUN 18 mg/dL      Creatinine 1.05 mg/dL      Sodium 138 mmol/L      Potassium 4.1 mmol/L      Chloride 107 mmol/L      CO2 23.0 mmol/L      Calcium 10.5 mg/dL      Total Protein 6.9 g/dL      Albumin 4.0 g/dL      ALT (SGPT) 11 U/L      AST (SGOT) 24 U/L      Alkaline Phosphatase 54 U/L      Total Bilirubin 0.3 mg/dL      Globulin 2.9 gm/dL      A/G Ratio 1.4 g/dL      BUN/Creatinine Ratio 17.1     Anion Gap 8.0 mmol/L      eGFR 61.3 mL/min/1.73     Narrative:      GFR Normal >60  Chronic Kidney Disease <60  Kidney Failure <15      aPTT [[id number]]  (Normal) Collected: 10/22/24 1424    Specimen: Blood Updated: 10/22/24 1448     PTT 33.3 seconds     Narrative:      PTT = The equivalent PTT values for the therapeutic range of heparin levels at 0.1 to 0.7 U/ml are 53 to 110 seconds.      Protime-INR [[id number]]  (Abnormal) Collected: 10/22/24 1424    Specimen: Blood Updated: 10/22/24 1448     Protime 14.7 Seconds      INR 1.11    Narrative:      Therapeutic Ranges for INR: 2.0-3.0 (PT 20-30)                              2.5-3.5 (PT 25-34)    Lavender Top [[id number]] Collected: 10/22/24 1424    Specimen: Blood Updated: 10/22/24 1446     Extra Tube hold for add-on     Comment: Auto resulted       Green Top (Gel) [[id number]] Collected: 10/22/24 1424    Specimen: Blood Updated: 10/22/24 1446     Extra Tube Hold for add-ons.     Comment: Auto resulted.       Light Blue Top [[id number]] Collected: 10/22/24 1424    Specimen: Blood Updated: 10/22/24 1446     Extra Tube Hold for add-ons.     Comment: Auto resulted       CBC & Differential [[id number]]  (Abnormal) Collected: 10/22/24 1424    Specimen: Blood Updated: 10/22/24 1431    Narrative:      The following orders were created  for panel order CBC & Differential.  Procedure                               Abnormality         Status                     ---------                               -----------         ------                     CBC Auto Differential[[id number]]        Abnormal            Final result                 Please view results for these tests on the individual orders.    CBC Auto Differential [[id number]]  (Abnormal) Collected: 10/22/24 1424    Specimen: Blood Updated: 10/22/24 1438     WBC 6.04 10*3/mm3      RBC 3.76 10*6/mm3      Hemoglobin 12.2 g/dL      Hematocrit 37.8 %      .5 fL      MCH 32.4 pg      MCHC 32.3 g/dL      RDW 12.9 %      RDW-SD 47.7 fl      MPV 9.8 fL      Platelets 178 10*3/mm3      Neutrophil % 66.8 %      Lymphocyte % 21.7 %      Monocyte % 7.6 %      Eosinophil % 2.5 %      Basophil % 1.2 %      Immature Grans % 0.2 %      Neutrophils, Absolute 4.04 10*3/mm3      Lymphocytes, Absolute 1.31 10*3/mm3      Monocytes, Absolute 0.46 10*3/mm3      Eosinophils, Absolute 0.15 10*3/mm3      Basophils, Absolute 0.07 10*3/mm3      Immature Grans, Absolute 0.01 10*3/mm3      nRBC 0.0 /100 WBC             Imaging Results (Last 24 Hours)       Procedure Component Value Units Date/Time    XR Knee 3 View Left [[id number]] Collected: 10/22/24 1300     Updated: 10/22/24 1305    Narrative:      XR KNEE 3 VW LEFT    Date of Exam: 10/22/2024 12:52 PM EDT    Indication: Fall, left knee pain    Comparison: None available.    Findings:  There is evidence for a nondisplaced longitudinal fracture involving the patella. There is no separation of fracture fragments. The articulations of the knee remain intact without malalignment. There is no joint effusion.      Impression:      Impression:  1.Evidence for a potential longitudinal nondisplaced fracture of the patella. Recommend correlation for point tenderness localizing to the patella.  2.The articulations of the knee remain intact without dislocation.  3.There is no  joint effusion.      Electronically Signed: Peng Erwin MD    10/22/2024 1:02 PM EDT    Workstation ID: [id number]    XR Forearm 2 View Left [[id number]] Collected: 10/22/24 1259     Updated: 10/22/24 1304    Narrative:      XR FOREARM 2 VW LEFT, XR HAND 3+ VW LEFT    Date of Exam: 10/22/2024 12:41 PM EDT    Indication: Fall, pain    Comparison: None available.    Findings:  There is comminuted fracture involving the distal radius with approximately 10 degrees dorsal angulation of the distal fracture fragment and 5 mm of distraction. No intra-articular extension is identified. No other fracture identified. Alignment is   normal. Joint space is adequately maintained. Soft tissues are unremarkable.      Impression:      Impression:  Comminuted impaction fracture involving the distal radius.      Electronically Signed: David Savage MD    10/22/2024 1:01 PM EDT    Workstation ID: [id number]    XR Hand 3+ View Left [[id number]] Collected: 10/22/24 1259     Updated: 10/22/24 1304    Narrative:      XR FOREARM 2 VW LEFT, XR HAND 3+ VW LEFT    Date of Exam: 10/22/2024 12:41 PM EDT    Indication: Fall, pain    Comparison: None available.    Findings:  There is comminuted fracture involving the distal radius with approximately 10 degrees dorsal angulation of the distal fracture fragment and 5 mm of distraction. No intra-articular extension is identified. No other fracture identified. Alignment is   normal. Joint space is adequately maintained. Soft tissues are unremarkable.      Impression:      Impression:  Comminuted impaction fracture involving the distal radius.      Electronically Signed: David Savage MD    10/22/2024 1:01 PM EDT    Workstation ID: [id number]    XR Wrist 3+ View Left [[id number]] Collected: 10/22/24 1258     Updated: 10/22/24 1302    Narrative:      XR WRIST 3+ VW LEFT    Date of Exam: 10/22/2024 12:36 PM EDT    Indication: Fall, left wrist pain    Comparison: None available.    Findings:  There is a  transverse impacted fracture involving the distal metaphysis of the left radius. There is mild dorsal angulation of the distal fracture fragment. There may be an associated nondisplaced fracture of the distal ulnar metaphysis as well. The   articulations of the wrist remain intact without dislocation. Mild changes of osteoarthritis are noted at the wrist. Surrounding soft tissue swelling is observed.      Impression:      Impression:  1.Transverse impacted fracture involving the distal metaphysis of the radius. There is mild dorsal angulation of the distal fracture fragment.  2.There may be a nondisplaced transverse fracture of the distal ulna as well.  3.The articulations of the wrist remain intact without dislocation.      Electronically Signed: Peng Erwin MD    10/22/2024 12:59 PM EDT    Workstation ID: [id number]            X-ray {Desc; reviewed/not reviewed:46711} personally by physician.      ECG {Desc; reviewed/not reviewed:25526} personally by physician  ECG/EMG Results (most recent)       Procedure Component Value Units Date/Time    ECG 12 Lead Pre-Op / Pre-Procedure [[id number]] Collected: 10/23/24 0644     Updated: 10/23/24 0645     QT Interval 451 ms      QTC Interval 454 ms     Narrative:      HEART RATE=61  bpm  RR Ocvloyuy=908  ms  UT Rdjqqhxm=445  ms  P Horizontal Axis=231  deg  P Front Axis=106  deg  QRSD Jvcmgnzb=381  ms  QT Zjenkjeq=856  ms  HVfN=501  ms  QRS Axis=-81  deg  T Wave Axis=98  deg  - ABNORMAL ECG -  Atrial-ventricular dual-paced rhythm  Date and Time of Study:2024-10-23 06:44:27            Medication Review:   I have reviewed the patient's current medication list    Current Facility-Administered Medications:     acetaminophen (TYLENOL) tablet 650 mg, 650 mg, Oral, Q6H PRN **OR** acetaminophen (TYLENOL) suppository 650 mg, 650 mg, Rectal, Q4H PRN, Jeannette Mon APRN    HYDROcodone-acetaminophen (NORCO) 7.5-325 MG per tablet 1 tablet, 1 tablet, Oral, Q4H PRN, Pierre Xavier MD,  1 tablet at 10/22/24 1741    [COMPLETED] Insert Peripheral IV, , , Once **AND** sodium chloride 0.9 % flush 10 mL, 10 mL, Intravenous, PRN, Pierre Xavier MD      Assessment & Plan     ***    Plan for disposition:***    Pierre Xavier MD  10/23/24  09:03 EDT      Time: {Time spent:[id number]}

## 2024-10-23 NOTE — OP NOTE
Date of surgery: 10/23/24     Preoperative diagnosis:  1.  Left displaced distal radius fracture    Postoperative diagnosis:  1.  Left displaced extra-articular distal radial fracture    Procedure:  1.  Open reduction internal fixation left displaced extra-articular distal radial fracture [id number]  Surgeon: Karl Powell MD    Assistant.:  Assistant: (Imtiaz Espinal CSA) was responsible for performing the following activities: Retraction, Suction, Suturing, Closing, and Placing Dressing and their skilled assistance was necessary for the success of this case.     Anesthesia: MAC plus regional block    Estimated blood loss: minimal    Urine output: Not recorded    Specimen: None    Drains: None    Complications: None        Implants: [employer] Geminus Distal radius system, 6 locking screws distally, 1 cortical 2 locking screws proximally    Indications for procedure: Patient is a pleasant 59 y.o. female who sustained a displaced intra-articular left distal radius fracture when she fell yesterday cleaning her fish tank.  Given significant limitation and use of left upper extremity as well as associated pain and displacement of the fracture, patient wished to proceed with open reduction internal fixation left distal radius and all associated procedures.  Patient was explained details of the procedure as well as risks, benefits, and alternatives as documented on history and physical and had all questions answered prior to signing the operative consent form.  No guarantees were given in regards to results of surgery.    Details of procedure: Patient was seen and evaluated and cleared for surgery by anesthesia.  Patient was met in the preoperative holding area, operative site was marked and consent was reviewed, history physical was updated, and preoperative labs were reviewed.  Regional block was then placed per anesthesia.  Patient was then taken operative room and placed in supine position on a regular OR table  with hand table to the operative side.  After appropriate sedation per anesthesia, nonsterile tourniquet was applied to left upper extremity, left arm was sterilely prepped and draped in standard fashion.    Formal timeout was completed including confirmation of history physical, operative consent, surgical site, patient identification number, and preoperative antibiotic administration.  Left arm was then exsanguinated and tourniquet inflated to 250 mmHg.  Procedure was begun with a standard 6 cm incision with an angulated distal end of the incision to cross the proximal and distal wrist creases at 45° angles through skin only with a 15 blade.  Incision was carried through subcutaneous layer with tenotomy scissor.  Flexor carpi radialis tendon was identified and its tendon sheath was opened.  FCR was then retracted ulnarly and radial artery was identified radially and retracted in that direction.  The floor of the FCR tendon sheath was then opened with the tenotomy scissor identifying pronator quadratus at this time.  Pronator quadratus was sharply elevated from the volar surface of the distal radius with a knife followed by use of a periosteal elevator.  The transitional fibrous zone was also identified and retracted distally.  The fracture ends were debrided and curetted and a freer was used to reduce the distal fracture fragment into appropriate anatomic position. A K wire was fired in retrograde fashion from the tip of the radial styloid in an oblique manner securing reduction of the fracture at this time.    With fracture having been reduced at this time, an appropriately sized distal radius locking plate was chosen and placed over the volar aspect of the left distal radius.  Tentative reduction and alignment of the plate was held by hand while C-arm fluoroscopy was used to assess position of the plate as well as reduction of the fracture fragments which were noted to be in acceptable position.  Bicortical  screw was then drilled and placed into the sliding hole proximally while a K wire was placed distally maintaining the reduction and obtaining appropriate placement of the hardware with alignment of the fracture.  2 central distal screws were then drilled and placed in locking fashion at this point in time followed by removal of the K wire.  Given stable alignment of fracture site as well as plate position, additional distal locking screws were drilled, measured, and placed in standard fashion this time.  Radial styloid screw was directed into appropriate trajectory with the variable angle guide and a variable angle screw was placed at this time.    2 additional cross locking screws were placed in the shaft proximally to allow for proximal stabilization of the plate.  These were placed in bicortical fashion.  Final fluoroscopic images were taken at this point in time confirming appropriate reduction of the fracture fragments as well as alignment of the plate and screws.  Wound was then thoroughly irrigated with normal saline at this point in time and tourniquet was released.  Hemostasis was obtained with bipolar electrocautery.  Attention was then turned to closure of the wound with 2-0 monocryl for subcutaneous layer and 3-0 strata fix for subcuticular closure followed by steri strips, Telfa, 4 x 4 gauze, web roll, 3x12 volar splint, and Ace wrap.     At the end of procedure all needle and sponge counts were correct ×2.  Patient had brisk cap refill to all digits of the left upper extremity and compartments were soft and easily compressible at the end of the procedure.    Disposition: Patient was appropriately recovered from anesthesia and taken to the recovery room in stable condition.  We will plan for post operative discharge.  Results of procedure were discussed immediately postoperatively with patient's family and they had all questions answered at that time.

## 2024-10-23 NOTE — PLAN OF CARE
Problem: Adult Inpatient Plan of Care  Goal: Plan of Care Review  Outcome: Adequate for Care Transition  Goal: Patient-Specific Goal (Individualized)  Outcome: Adequate for Care Transition  Goal: Absence of Hospital-Acquired Illness or Injury  Outcome: Adequate for Care Transition  Intervention: Prevent Skin Injury  Recent Flowsheet Documentation  Taken 10/23/2024 0908 by Noy Carpenter RN  Skin Protection: drying agents applied  Intervention: Prevent and Manage VTE (Venous Thromboembolism) Risk  Recent Flowsheet Documentation  Taken 10/23/2024 0908 by Noy Carpenter RN  VTE Prevention/Management:   bilateral   SCDs (sequential compression devices) off   patient refused intervention  Goal: Optimal Comfort and Wellbeing  Outcome: Adequate for Care Transition  Intervention: Provide Person-Centered Care  Recent Flowsheet Documentation  Taken 10/23/2024 0908 by Noy Carpenter RN  Trust Relationship/Rapport:   care explained   choices provided  Goal: Readiness for Transition of Care  Outcome: Adequate for Care Transition     Problem: Surgery Nonspecified  Goal: Absence of Bleeding  Outcome: Adequate for Care Transition  Goal: Effective Bowel Elimination  Outcome: Adequate for Care Transition  Goal: Fluid and Electrolyte Balance  Outcome: Adequate for Care Transition  Goal: Blood Glucose Level Within Target Range  Outcome: Adequate for Care Transition  Goal: Absence of Infection Signs and Symptoms  Outcome: Adequate for Care Transition  Goal: Anesthesia/Sedation Recovery  Outcome: Adequate for Care Transition  Goal: Optimal Pain Control and Function  Outcome: Adequate for Care Transition  Intervention: Prevent or Manage Pain  Recent Flowsheet Documentation  Taken 10/23/2024 0908 by Noy Carpenter RN  Diversional Activities:   television   smartphone  Goal: Nausea and Vomiting Relief  Outcome: Adequate for Care Transition  Intervention: Prevent or Manage Nausea and Vomiting  Recent Flowsheet  Documentation  Taken 10/23/2024 0947 by Noy Carpenter RN  Nausea/Vomiting Interventions: medication given  Goal: Effective Urinary Elimination  Outcome: Adequate for Care Transition  Goal: Effective Oxygenation and Ventilation  Outcome: Adequate for Care Transition  Intervention: Optimize Oxygenation and Ventilation  Recent Flowsheet Documentation  Taken 10/23/2024 0908 by Noy Carpenter RN  Cough And Deep Breathing: done independently per patient     Problem: Pain Acute  Goal: Optimal Pain Control and Function  Outcome: Adequate for Care Transition  Intervention: Optimize Psychosocial Wellbeing  Recent Flowsheet Documentation  Taken 10/23/2024 0908 by Noy Carpenter RN  Diversional Activities:   television   [place]     Problem: Fall Injury Risk  Goal: Absence of Fall and Fall-Related Injury  Outcome: Adequate for Care Transition     Problem: Skin Injury Risk Increased  Goal: Skin Health and Integrity  Outcome: Adequate for Care Transition  Intervention: Optimize Skin Protection  Recent Flowsheet Documentation  Taken 10/23/2024 0908 by Noy Carpenter RN  Pressure Reduction Techniques: frequent weight shift encouraged  Pressure Reduction Devices:   pressure-redistributing mattress utilized   positioning supports utilized  Skin Protection: drying agents applied   Goal Outcome Evaluation:

## 2024-10-23 NOTE — NURSING NOTE
"Patient requested pain pill for a pain 10 out of 10. Patient had gotten Norco earlier in the shift so  took this to her. Went in her room and she became verbally aggressive and cursing towards this nurse. Stated she had been waiting 4 hours for me to  bring her something for pain. Stated I came to her room as soon as I heard she  needed something. Began cussing at this nurse and stated she didn't want to hear any excuses. She refused Norco because she stated it made her feel too woozy. When asked her if she had taken anything in the past for pain that worked for her she stated \"aren't you medical? That's your job\". Stated she did not want anything with Hydrocodone or Oxy in it. Received order for Tylenol. When took Tylenolin room, she refused it. Told this nurse she was on eliquis so couldn't take it.She told this nurse to take it away and not give her anything.   "

## 2024-10-23 NOTE — ANESTHESIA POSTPROCEDURE EVALUATION
Patient: Karie Potter    Procedure Summary       Date: 10/23/24 Room / Location:  [facility] /  [facility]    Anesthesia Start: 1429 Anesthesia Stop: 1539    Procedure: OPEN REDUCTION INTERNAL FIXATION DISTAL RADIUS (Left: Wrist) Diagnosis:       Closed fracture of distal end of left radius, unspecified fracture morphology, initial encounter      (Closed fracture of distal end of left radius, unspecified fracture morphology, initial encounter [S52.502A])    Surgeons: Karl Powell MD Provider: Vidih Montelongo CRNA    Anesthesia Type: general ASA Status: 3            Anesthesia Type: general    Vitals  Vitals Value Taken Time   /73 10/23/24 1640   Temp 97.8 °F (36.6 °C) 10/23/24 1545   Pulse 60 10/23/24 1643   Resp 12 10/23/24 1630   SpO2 96 % 10/23/24 1643   Vitals shown include unfiled device data.        Post Anesthesia Care and Evaluation    Patient location during evaluation: PACU  Patient participation: complete - patient participated  Level of consciousness: awake and alert  Pain score: 7  Pain management: satisfactory to patient    Airway patency: patent  Anesthetic complications: No anesthetic complications  PONV Status: none  Cardiovascular status: acceptable  Respiratory status: acceptable  Hydration status: acceptable

## 2024-10-23 NOTE — PLAN OF CARE
"Goal Outcome Evaluation:  Plan of Care Reviewed With: patient        Progress: improving  Outcome Evaluation: VSS. Patient not happy with pain control. Refuses everything offered to  her. NPO after MN. Would not let this nurse take drinks off of table. Patient stated she took off Nicotine patch because it was making her nauseated. Will not wear splint  or sling to wrist, nor wear immobilizer to left knee. Stated they won't stay on. Gets up out of bed by self. Encouraged to call for assistance when needing to get up. she stated \"she did not need help\".  Very rude and aggressive with this nurse.                             "

## 2024-10-23 NOTE — DISCHARGE INSTRUCTIONS
Post Operative Wrist Surgery Orders/Instructions:     I. ACTIVITIES:  1. Exercises/Activities of Daily Living:  Strict NON-WEIGHTBEARING status.  Ice and elevate the operative extremity above level of heart  No tub baths, hot tubs, or swimming pools  Your surgeon will discuss with you when you will be able to drive again.  Everyone that comes near you should wash their hands  Avoid sick people.     IV. INCISION CARE:  Do NOT remove the dressing  Keep splint/dressing DRY  No creams or ointments to the incision  Check dressing every day and notify surgeon immediately:  If any significant drainage   Increase in swelling of the extremity  Increase in pain  Increase in overall body temperature (greater than 100.5 degrees)    V. Medications:   1. Stool Softeners: You will be at greater risk of constipation after surgery due to being less mobile and the pain medications.   Take stool softeners as instructed by your surgeon while on pain medications. Over the counter Colace 100 mg 1-2 capsules twice daily.   If stools become too loose or too frequent, please decreases the dosage or stop the stool softener.  If constipation occurs despite use of stool softeners, you are to continue the stool softeners and add a laxative (Milk of Magnesia 1 ounce daily as needed)  Drink plenty of fluids, and eat fruits and vegetables during your recovery time    2. Pain Medications utilized after surgery are narcotics and the law requires that the following information be given to all patients that are prescribed narcotics:  CLASSIFICATION: Pain medications are called Opioids and are narcotics  LEGALITIES: It is illegal to share narcotics with others and to drive within 24 hours of taking narcotics  POTENTIAL SIDE EFFECTS: Potential side effects of opioids include: nausea, vomiting, itching, dizziness, drowsiness, dry mouth, constipation, and difficulty urinating.  POTENTIAL ADVERSE EFFECTS:   Opioid tolerance can develop with use of pain  medications and this simply means that it requires more and more of the medication to control pain; however, this is seen more in patients that use opioids for longer periods of time.  Opioid dependence can develop with use of Opioids and this simply means that to stop the medication can cause withdrawal symptoms; however, this is seen with patients that use Opioids for longer periods of time.  Opioid addiction can develop with use of Opioids and the incidence of this is very unlikely in patients who take the medications as ordered and stop the medications as instructed.  Opioid overdose can be dangerous, but is unlikely when the medication is taken as ordered and stopped when ordered. It is important not to mix opioids with alcohol or with and type of sedative such as Benadryl as this can lead to over sedation and respiratory difficulty.  DOSAGE:   Pain medications will need to be taken consistently for the first week to decrease pain and promote adequate pain relief and participation in physical therapy.  After the initial surgical pain begins to resolve, you may begin to decrease the pain medication. By the end of 6 weeks, you should be off of pain medications.  Refills will not be given by the office during evening hours, on weekends, or after 12 weeks post-op.  To seek refills on pain medications during the initial 6 week post-operative period, you must call the office 48 hours in advance to request the refill. The office will then notify you when to  the prescription. DO NOT wait until you are out of the medication to request a refill.      V. FOLLOW-UP VISITS:  You will need to follow up in the office with your surgeon in 10-14 days. Please call this number [phone] to schedule this appointment.  If you have any concerns or suspected complications prior to your follow up visit, please call your surgeons office. Do not wait until your appointment time if you suspect complications. These will need  to be addressed in the office promptly.

## 2024-10-23 NOTE — NURSING NOTE
1020:     Pt requested information on how long surgery would last. This RN called surgery and spoke with Noy in preop, was quoted 3-4 hours. This RN attempted to update patient and pt was resting comfortably. Will update once patient wakes. Care is ongoing.       1040: This RN attempted to update patient a second time and pt was still sleeping comfortably. Pt was not disturbed. Will revisit. Care is ongoing.

## 2024-10-23 NOTE — NURSING NOTE
"Pt requesting pain medication, states norco \"causes her to get sick\" and states tylenol \"wont help.\" RN notified MD, he is currently rounding. Will reassess after rounding. This RN went in to update patient, but she was asleep. Note wrote with update on white board. Will revisit.  "

## 2024-10-23 NOTE — NURSING NOTE
This RN called pts husbands per pt and her husbands request to let pt know that they were returning to the unit shortly.

## 2024-10-23 NOTE — PROGRESS NOTES
"DAILY PROGRESS NOTE  [facility]  0  ORTHOPEDIC SURGERY DAILY PROGRESS NOTE  [facility]  LENGTH OF STAY 0 DAYS      Patient Identification:  Name: Karie Potter  Age: 59 y.o.  Sex: female  :  1965  MRN: [id number]         Primary Care Physician: Naa Vale MD      Subjective:  Interval History: no issues overnight.  Pain well-controlled.  Patient ambulated.  Denies any numbness in hand    Objective:    Scheduled Meds:ceFAZolin, 2,000 mg, Intravenous, Once      Continuous Infusions:lactated ringers, 30 mL/hr, Last Rate: 30 mL/hr (10/23/24 1318)        Vital signs in last 24 hours:  Temp:  [97.1 °F (36.2 °C)-98.3 °F (36.8 °C)] 98.1 °F (36.7 °C)  Heart Rate:  [60-67] 61  Resp:  [16-17] 16  BP: ()/(53-70) 119/59    Intake/Output:    Intake/Output Summary (Last 24 hours) at 10/23/2024 1325  Last data filed at 10/23/2024 0900  Gross per 24 hour   Intake --   Output 1200 ml   Net -1200 ml       Exam:  /59 (BP Location: Right arm, Patient Position: Lying)   Pulse 61   Temp 98.1 °F (36.7 °C) (Oral)   Resp 16   Ht 167.6 cm (66\")   Wt 60.8 kg (134 lb)   SpO2 95%   BMI 21.63 kg/m²   General: No acute distress, Aox3  Pulmonary: Unlabored breathing  Cardiovascular: Regular rate and rhythm   Left upper extremity  brisk capillary refill  Sensation normal  No signs of DVT compartment syndrome or acute carpal tunnel syndrome  Able to weakly flex and extend all 4 fingers and thumb  Left lower extremity  Sensation diminished from mid shins down (baseline neuropathy)  2+ dorsalis pedis and posterior tibial pulse  5 out of 5 ankle plantarflexion dorsiflexion inversion and eversion  Dressing clean dry and intact  No signs of DVT or compartment syndrome  Able to Perform straight leg raise  Point maximal tenderness mid patella                Data Review:  Lab Results   Component Value Date    CALCIUM 10.5 10/22/2024     Results from last 7 days   Lab Units 10/22/24  1424   AST " (SGOT) U/L 24   ALT (SGPT) U/L 11   SODIUM mmol/L 138   POTASSIUM mmol/L 4.1   CHLORIDE mmol/L 107   CO2 mmol/L 23.0   BUN mg/dL 18   CREATININE mg/dL 1.05*   GLUCOSE mg/dL 82   CALCIUM mg/dL 10.5   WBC 10*3/mm3 6.04   HEMOGLOBIN g/dL 12.2   PLATELETS 10*3/mm3 178     Lab Results   Component Value Date    CKTOTAL 74 12/01/2022    TROPONINT <0.010 02/14/2019     Estimated Creatinine Clearance: 55.4 mL/min (A) (by C-G formula based on SCr of 1.05 mg/dL (H)).  WEIGHTS:     Wt Readings from Last 1 Encounters:   10/22/24 1637 60.8 kg (134 lb)   10/22/24 1217 60.8 kg (134 lb)         Assessment:    Closed fracture of left distal radius    Distal radial fracture      Plan:  59 female with displaced left intra-articular distal radial fracture and left nondisplaced longitudinal patella fracture  Weightbearing as tolerated left lower extremity in knee immobilizer  N.p.o.  Consented and marked  OR today for ORIF left distal radius  May discharge home following surgery          Karl Powell MD  10/23/2024  13:25 EDT

## 2024-10-23 NOTE — PROGRESS NOTES
Staff notes patient has been extremely rude and verbally aggressive using profanity toward them repeatedly. Refuses tylenol, norco despite complaining of pain. She states that she cannot take tylenol with eliquis. Staff attempted education, patient dismissed them from her room.

## 2024-10-23 NOTE — DISCHARGE SUMMARY
Karie Potter  1965  [id number]    Hospitalists Discharge Summary    Date of Admission: 10/22/2024  Date of Discharge:  10/23/2024    Primary Discharge Diagnoses: ***    Secondary Discharge Diagnoses: ***      History of Present Illness:***    Hospital Course:      PCP  Patient Care Team:  Naa Vale MD as PCP - General (Internal Medicine)  Peter Jones MD as Referring Physician (Family Medicine)  Matthew Frazier MD as Consulting Physician (Hematology and Oncology)  Camryn Romero MD as Consulting Physician (Hematology and Oncology)  Himanshu Luna MD as Consulting Physician (Hematology and Oncology)    Consults:   Consults       Date and Time Order Name Status Description    10/22/2024  3:50 PM Inpatient Orthopedic Surgery Consult Completed             Operations and Procedures Performed:  Procedure(s):  OPEN REDUCTION INTERNAL FIXATION DISTAL RADIUS     XR Wrist 3+ View Left    Result Date: 10/23/2024  Narrative: XR WRIST 3+ VW LEFT Date of Exam: 10/23/2024 4:08 PM EDT Indication: Post-Op Comparison: None available. Findings: Postoperative changes are noted status post open reduction internal fixation of the distal left radial fracture. Plate and screw fixation hardware is noted. Near-anatomic alignment of fracture fragments is observed following fixation. There is anatomic alignment of the articulations of the wrist. Overlying cast material is noted.     Impression: Impression: Postoperative changes status post open reduction internal fixation of the distal radius. Near anatomic alignment of fracture fragments is noted following reduction and fixation. There is also anatomic alignment of the articulations of the wrist. Electronically Signed: Peng Erwin MD  10/23/2024 4:15 PM EDT  Workstation ID: [id number]    XR Wrist 3+ View Left    Result Date: 10/23/2024  Narrative: XR WRIST 3+ VW LEFT, FL C ARM DURING SURGERY Date of Exam: 10/23/2024 3:54 PM EDT Indication: ORIF Comparison:  10/22/2024 Findings: Intraoperative imaging was utilized during sideplate fixation of the transverse distal radial fracture. A total of 3 C-arm images are submitted. Total fluoroscopy time for the procedure was 12.68 seconds     Impression: Impression: Intraoperative imaging during open reduction internal fixation of the distal radial fracture Electronically Signed: Jorden Painting MD  10/23/2024 4:01 PM EDT  Workstation ID: [id number]    FL C Arm During Surgery    Result Date: 10/23/2024  Narrative: XR WRIST 3+ VW LEFT, FL C ARM DURING SURGERY Date of Exam: 10/23/2024 3:54 PM EDT Indication: ORIF Comparison: 10/22/2024 Findings: Intraoperative imaging was utilized during sideplate fixation of the transverse distal radial fracture. A total of 3 C-arm images are submitted. Total fluoroscopy time for the procedure was 12.68 seconds     Impression: Impression: Intraoperative imaging during open reduction internal fixation of the distal radial fracture Electronically Signed: Jorden Painting MD  10/23/2024 4:01 PM EDT  Workstation ID: [id number]    XR Knee 3 View Left    Result Date: 10/22/2024  Narrative: XR KNEE 3 VW LEFT Date of Exam: 10/22/2024 12:52 PM EDT Indication: Fall, left knee pain Comparison: None available. Findings: There is evidence for a nondisplaced longitudinal fracture involving the patella. There is no separation of fracture fragments. The articulations of the knee remain intact without malalignment. There is no joint effusion.     Impression: Impression: 1.Evidence for a potential longitudinal nondisplaced fracture of the patella. Recommend correlation for point tenderness localizing to the patella. 2.The articulations of the knee remain intact without dislocation. 3.There is no joint effusion. Electronically Signed: Peng Erwin MD  10/22/2024 1:02 PM EDT  Workstation ID: [id number]    XR Forearm 2 View Left    Result Date: 10/22/2024  Narrative: XR FOREARM 2 VW LEFT, XR HAND 3+ VW LEFT Date of Exam:  10/22/2024 12:41 PM EDT Indication: Fall, pain Comparison: None available. Findings: There is comminuted fracture involving the distal radius with approximately 10 degrees dorsal angulation of the distal fracture fragment and 5 mm of distraction. No intra-articular extension is identified. No other fracture identified. Alignment is normal. Joint space is adequately maintained. Soft tissues are unremarkable.     Impression: Impression: Comminuted impaction fracture involving the distal radius. Electronically Signed: David Savage MD  10/22/2024 1:01 PM EDT  Workstation ID: [id number]    XR Hand 3+ View Left    Result Date: 10/22/2024  Narrative: XR FOREARM 2 VW LEFT, XR HAND 3+ VW LEFT Date of Exam: 10/22/2024 12:41 PM EDT Indication: Fall, pain Comparison: None available. Findings: There is comminuted fracture involving the distal radius with approximately 10 degrees dorsal angulation of the distal fracture fragment and 5 mm of distraction. No intra-articular extension is identified. No other fracture identified. Alignment is normal. Joint space is adequately maintained. Soft tissues are unremarkable.     Impression: Impression: Comminuted impaction fracture involving the distal radius. Electronically Signed: David Savage MD  10/22/2024 1:01 PM EDT  Workstation ID: [id number]    XR Wrist 3+ View Left    Result Date: 10/22/2024  Narrative: XR WRIST 3+ VW LEFT Date of Exam: 10/22/2024 12:36 PM EDT Indication: Fall, left wrist pain Comparison: None available. Findings: There is a transverse impacted fracture involving the distal metaphysis of the left radius. There is mild dorsal angulation of the distal fracture fragment. There may be an associated nondisplaced fracture of the distal ulnar metaphysis as well. The articulations of the wrist remain intact without dislocation. Mild changes of osteoarthritis are noted at the wrist. Surrounding soft tissue swelling is observed.     Impression: Impression: 1.Transverse  impacted fracture involving the distal metaphysis of the radius. There is mild dorsal angulation of the distal fracture fragment. 2.There may be a nondisplaced transverse fracture of the distal ulna as well. 3.The articulations of the wrist remain intact without dislocation. Electronically Signed: Peng Erwin MD  10/22/2024 12:59 PM EDT  Workstation ID: [id number]     Allergies:  is allergic to azithromycin, erythromycin, penicillins, norco [hydrocodone-acetaminophen], and sulfamethoxazole-trimethoprim.    Trevon  {Desc; reviewed/not reviewed:85749}    Discharge Medications:     Discharge Medications        New Medications        Instructions Start Date   ondansetron 4 MG tablet  Commonly known as: Zofran   4 mg, Oral, Every 8 Hours PRN      oxyCODONE-acetaminophen 7.5-325 MG per tablet  Commonly known as: Percocet   1 tablet, Oral, Every 6 Hours PRN             Continue These Medications        Instructions Start Date   apixaban 5 MG tablet tablet  Commonly known as: ELIQUIS   5 mg, Every 12 Hours Scheduled      gabapentin 300 MG capsule  Commonly known as: NEURONTIN   300 mg, 2 Times Daily      loperamide 2 MG capsule  Commonly known as: IMODIUM   8 mg, Daily      multivitamin with minerals tablet tablet   1 tablet, Daily      topiramate 50 MG tablet  Commonly known as: TOPAMAX   50 mg, 2 Times Daily      Wellbutrin  MG 24 hr tablet  Generic drug: buPROPion XL   300 mg, Daily               Last Lab Results:   Lab Results (most recent)       Procedure Component Value Units Date/Time    [place] Draw [[id number]] Collected: 10/22/24 1424    Specimen: Blood Updated: 10/22/24 2147    Narrative:      The following orders were created for panel order [place] Draw.  Procedure                               Abnormality         Status                     ---------                               -----------         ------                     Green Top (Gel)[[id number]]                                  Final result                Lavender Top[[id number]]                                     Final result               Gold Top - SST[[id number]]                                   Final result               Light Blue Top[[id number]]                                   Final result                 Please view results for these tests on the individual orders.    Gold Top - SST [[id number]] Collected: 10/22/24 1424    Specimen: Blood Updated: 10/22/24 2147     Extra Tube Hold for add-ons.     Comment: Auto resulted.       Comprehensive Metabolic Panel [[id number]]  (Abnormal) Collected: 10/22/24 1424    Specimen: Blood Updated: 10/22/24 1454     Glucose 82 mg/dL      BUN 18 mg/dL      Creatinine 1.05 mg/dL      Sodium 138 mmol/L      Potassium 4.1 mmol/L      Chloride 107 mmol/L      CO2 23.0 mmol/L      Calcium 10.5 mg/dL      Total Protein 6.9 g/dL      Albumin 4.0 g/dL      ALT (SGPT) 11 U/L      AST (SGOT) 24 U/L      Alkaline Phosphatase 54 U/L      Total Bilirubin 0.3 mg/dL      Globulin 2.9 gm/dL      A/G Ratio 1.4 g/dL      BUN/Creatinine Ratio 17.1     Anion Gap 8.0 mmol/L      eGFR 61.3 mL/min/1.73     Narrative:      GFR Normal >60  Chronic Kidney Disease <60  Kidney Failure <15      aPTT [[id number]]  (Normal) Collected: 10/22/24 1424    Specimen: Blood Updated: 10/22/24 1448     PTT 33.3 seconds     Narrative:      PTT = The equivalent PTT values for the therapeutic range of heparin levels at 0.1 to 0.7 U/ml are 53 to 110 seconds.      Protime-INR [[id number]]  (Abnormal) Collected: 10/22/24 1424    Specimen: Blood Updated: 10/22/24 1448     Protime 14.7 Seconds      INR 1.11    Narrative:      Therapeutic Ranges for INR: 2.0-3.0 (PT 20-30)                              2.5-3.5 (PT 25-34)    Lavender Top [[id number]] Collected: 10/22/24 1424    Specimen: Blood Updated: 10/22/24 1446     Extra Tube hold for add-on     Comment: Auto resulted       Green Top (Gel) [[id number]] Collected: 10/22/24 1424    Specimen: Blood Updated: 10/22/24 1446      Extra Tube Hold for add-ons.     Comment: Auto resulted.       Light Blue Top [[id number]] Collected: 10/22/24 1424    Specimen: Blood Updated: 10/22/24 1446     Extra Tube Hold for add-ons.     Comment: Auto resulted       CBC & Differential [[id number]]  (Abnormal) Collected: 10/22/24 1424    Specimen: Blood Updated: 10/22/24 1438    Narrative:      The following orders were created for panel order CBC & Differential.  Procedure                               Abnormality         Status                     ---------                               -----------         ------                     CBC Auto Differential[[id number]]        Abnormal            Final result                 Please view results for these tests on the individual orders.    CBC Auto Differential [[id number]]  (Abnormal) Collected: 10/22/24 1424    Specimen: Blood Updated: 10/22/24 1438     WBC 6.04 10*3/mm3      RBC 3.76 10*6/mm3      Hemoglobin 12.2 g/dL      Hematocrit 37.8 %      .5 fL      MCH 32.4 pg      MCHC 32.3 g/dL      RDW 12.9 %      RDW-SD 47.7 fl      MPV 9.8 fL      Platelets 178 10*3/mm3      Neutrophil % 66.8 %      Lymphocyte % 21.7 %      Monocyte % 7.6 %      Eosinophil % 2.5 %      Basophil % 1.2 %      Immature Grans % 0.2 %      Neutrophils, Absolute 4.04 10*3/mm3      Lymphocytes, Absolute 1.31 10*3/mm3      Monocytes, Absolute 0.46 10*3/mm3      Eosinophils, Absolute 0.15 10*3/mm3      Basophils, Absolute 0.07 10*3/mm3      Immature Grans, Absolute 0.01 10*3/mm3      nRBC 0.0 /100 WBC           Imaging Results (Most Recent)       Procedure Component Value Units Date/Time    XR Wrist 3+ View Left [[id number]] Collected: 10/23/24 1614     Updated: 10/23/24 1619    Narrative:      XR WRIST 3+ VW LEFT    Date of Exam: 10/23/2024 4:08 PM EDT    Indication: Post-Op    Comparison: None available.    Findings:  Postoperative changes are noted status post open reduction internal fixation of the distal left radial fracture.  Plate and screw fixation hardware is noted. Near-anatomic alignment of fracture fragments is observed following fixation. There is anatomic   alignment of the articulations of the wrist. Overlying cast material is noted.      Impression:      Impression:  Postoperative changes status post open reduction internal fixation of the distal radius. Near anatomic alignment of fracture fragments is noted following reduction and fixation. There is also anatomic alignment of the articulations of the wrist.      Electronically Signed: Peng Erwin MD    10/23/2024 4:15 PM EDT    Workstation ID: [id number]    XR Wrist 3+ View Left [[id number]] Collected: 10/23/24 1559     Updated: 10/23/24 1604    Narrative:      XR WRIST 3+ VW LEFT, FL C ARM DURING SURGERY    Date of Exam: 10/23/2024 3:54 PM EDT    Indication: ORIF    Comparison: 10/22/2024    Findings:  Intraoperative imaging was utilized during sideplate fixation of the transverse distal radial fracture. A total of 3 C-arm images are submitted. Total fluoroscopy time for the procedure was 12.68 seconds      Impression:      Impression:  Intraoperative imaging during open reduction internal fixation of the distal radial fracture      Electronically Signed: Jorden Painting MD    10/23/2024 4:01 PM EDT    Workstation ID: [id number]    FL C Arm During Surgery [[id number]] Collected: 10/23/24 1559     Updated: 10/23/24 1604    Narrative:      XR WRIST 3+ VW LEFT, FL C ARM DURING SURGERY    Date of Exam: 10/23/2024 3:54 PM EDT    Indication: ORIF    Comparison: 10/22/2024    Findings:  Intraoperative imaging was utilized during sideplate fixation of the transverse distal radial fracture. A total of 3 C-arm images are submitted. Total fluoroscopy time for the procedure was 12.68 seconds      Impression:      Impression:  Intraoperative imaging during open reduction internal fixation of the distal radial fracture      Electronically Signed: Jorden Painting MD    10/23/2024 4:01 PM EDT     Workstation ID: [id number]    XR Knee 3 View Left [[id number]] Collected: 10/22/24 1300     Updated: 10/22/24 1305    Narrative:      XR KNEE 3 VW LEFT    Date of Exam: 10/22/2024 12:52 PM EDT    Indication: Fall, left knee pain    Comparison: None available.    Findings:  There is evidence for a nondisplaced longitudinal fracture involving the patella. There is no separation of fracture fragments. The articulations of the knee remain intact without malalignment. There is no joint effusion.      Impression:      Impression:  1.Evidence for a potential longitudinal nondisplaced fracture of the patella. Recommend correlation for point tenderness localizing to the patella.  2.The articulations of the knee remain intact without dislocation.  3.There is no joint effusion.      Electronically Signed: Peng Erwin MD    10/22/2024 1:02 PM EDT    Workstation ID: [id number]    XR Forearm 2 View Left [[id number]] Collected: 10/22/24 1259     Updated: 10/22/24 1304    Narrative:      XR FOREARM 2 VW LEFT, XR HAND 3+ VW LEFT    Date of Exam: 10/22/2024 12:41 PM EDT    Indication: Fall, pain    Comparison: None available.    Findings:  There is comminuted fracture involving the distal radius with approximately 10 degrees dorsal angulation of the distal fracture fragment and 5 mm of distraction. No intra-articular extension is identified. No other fracture identified. Alignment is   normal. Joint space is adequately maintained. Soft tissues are unremarkable.      Impression:      Impression:  Comminuted impaction fracture involving the distal radius.      Electronically Signed: David Savage MD    10/22/2024 1:01 PM EDT    Workstation ID: [id number]    XR Hand 3+ View Left [[id number]] Collected: 10/22/24 1259     Updated: 10/22/24 1304    Narrative:      XR FOREARM 2 VW LEFT, XR HAND 3+ VW LEFT    Date of Exam: 10/22/2024 12:41 PM EDT    Indication: Fall, pain    Comparison: None available.    Findings:  There is comminuted fracture  involving the distal radius with approximately 10 degrees dorsal angulation of the distal fracture fragment and 5 mm of distraction. No intra-articular extension is identified. No other fracture identified. Alignment is   normal. Joint space is adequately maintained. Soft tissues are unremarkable.      Impression:      Impression:  Comminuted impaction fracture involving the distal radius.      Electronically Signed: David Savage MD    10/22/2024 1:01 PM EDT    Workstation ID: [id number]    XR Wrist 3+ View Left [[id number]] Collected: 10/22/24 1258     Updated: 10/22/24 1302    Narrative:      XR WRIST 3+ VW LEFT    Date of Exam: 10/22/2024 12:36 PM EDT    Indication: Fall, left wrist pain    Comparison: None available.    Findings:  There is a transverse impacted fracture involving the distal metaphysis of the left radius. There is mild dorsal angulation of the distal fracture fragment. There may be an associated nondisplaced fracture of the distal ulnar metaphysis as well. The   articulations of the wrist remain intact without dislocation. Mild changes of osteoarthritis are noted at the wrist. Surrounding soft tissue swelling is observed.      Impression:      Impression:  1.Transverse impacted fracture involving the distal metaphysis of the radius. There is mild dorsal angulation of the distal fracture fragment.  2.There may be a nondisplaced transverse fracture of the distal ulna as well.  3.The articulations of the wrist remain intact without dislocation.      Electronically Signed: Peng Erwin MD    10/22/2024 12:59 PM EDT    Workstation ID: [id number]            PROCEDURES  Procedure(s):  OPEN REDUCTION INTERNAL FIXATION DISTAL RADIUS    Condition on Discharge:  ***    Physical Exam at Discharge  Vital Signs  Temp:  [97.1 °F (36.2 °C)-98.3 °F (36.8 °C)] 97.5 °F (36.4 °C)  Heart Rate:  [60-65] 60  Resp:  [12-17] 16  BP: ()/(53-85) 137/68    Physical Exam:  Physical Exam   Constitutional: Patient appears  well-developed and well-nourished and in no acute distress   HEENT:   Head: Normocephalic and atraumatic.   Eyes:  Pupils are equal, round, and reactive to light. EOM are intact. Sclera are anicteric and non-injected.  Mouth and Throat: Patient has moist mucous membranes. Oropharynx is clear of any erythema or exudate.     Neck: Neck supple. No JVD present. No thyromegaly present. No lymphadenopathy present.  Cardiovascular: Regular rate, regular rhythm, S1 normal and S2 normal.  Exam reveals no gallop and no friction rub.  No murmur heard.  Pulmonary/Chest: Lungs are clear to auscultation bilaterally. No respiratory distress. No wheezes. No rhonchi. No rales.   Abdominal: Soft. Bowel sounds are normal. No distension and no mass. There is no hepatosplenomegaly. There is no tenderness.   Musculoskeletal: Normal Muscle tone  Extremities: No edema. Pulses are palpable in all 4 extremities.  Neurological: Patient is alert and oriented to person, place, and time. Cranial nerves II-XII are grossly intact with no focal deficits.  Skin: Skin is warm. No rash noted. Nails show no clubbing.  No cyanosis or erythema.    Discharge Disposition  ***    Visiting Nurse:    {YES/NO:200010}    Home PT/OT:  {YES/NO:200010}    Home Safety Evaluation:  {YES/NO:200010}    DME  ***    Discharge Diet:      Dietary Orders (From admission, onward)       Start     Ordered    10/23/24 1708  Diet: Regular/House; Fluid Consistency: Thin (IDDSI 0)  Diet Effective Now        References:    Diet Order [facility]   Question Answer Comment   Diets: Regular/House    Fluid Consistency: Thin (IDDSI 0)        10/23/24 1707                    Activity at Discharge:  ***    Pre-discharge education  {Discharge Education:80226}      Follow-up Appointments  Future Appointments   Date Time Provider Department Center   10/25/2024  3:40 PM [facility] MAMM 1 [facility] MAMMO [facility]   10/25/2024  4:30 PM [facility] ECHO CART 1 [facility] CARDI [facility]   10/28/2024 12:20 PM Berhane Butler,  MD [facility]     Additional Instructions for the Follow-ups that You Need to Schedule       Discharge Follow-up with PCP   As directed       Currently Documented PCP:    Naa Vale MD    PCP Phone Number:    [phone]     Follow Up Details: 1-2 weeks        Discharge Follow-up with Specified Provider: Dr. Powell; 2 Weeks   As directed      To: Dr. Powell   Follow Up: 2 Weeks                Test Results Pending at Discharge       Pierre Xavier MD  10/23/24  18:47 EDT    Time: {Time spent:988118165} (if over 30 minutes give explanation as to why it took greater than 30 minutes)

## 2024-10-23 NOTE — ANESTHESIA PROCEDURE NOTES
Airway  Urgency: elective    Date/Time: 10/23/2024 2:36 PM  Airway not difficult    General Information and Staff    Patient location during procedure: OR  CRNA/CAA: Vidhi Montelongo CRNA  SRNA: Chapin Deluna SRNA  Indications and Patient Condition  Indications for airway management: airway protection    Preoxygenated: yes  MILS maintained throughout  Mask difficulty assessment: 0 - not attempted    Final Airway Details  Final airway type: supraglottic airway      Successful airway: unique  Size 4     Number of attempts at approach: 1  Assessment: lips, teeth, and gum same as pre-op

## 2024-10-23 NOTE — NURSING NOTE
Continues to complain about night shift never going in room or offering her pain meds. This nurse was standing  outside room while she talking to dayshift nurse. She was checked on every hour, as is the policy and all call lights answered promptly except when I was assisting  other staff in ICU with a patient.

## 2024-10-23 NOTE — ANESTHESIA PREPROCEDURE EVALUATION
Anesthesia Evaluation     Patient summary reviewed and Nursing notes reviewed   no history of anesthetic complications:   NPO Solid Status: > 8 hours  NPO Liquid Status: > 8 hours           Airway   Mallampati: II  TM distance: >3 FB  Neck ROM: full  Dental - normal exam     Pulmonary - normal exam    breath sounds clear to auscultation  (+) pulmonary embolism, a smoker (.25 ppd) Current, Abstained day of surgery, cigarettes, COPD,  (-) shortness of breath, recent URI, sleep apnea  Cardiovascular   Exercise tolerance: good (4-7 METS)    Rhythm: regular  Rate: normal    (+) pacemaker pacemaker interrogated unknown, CAD, dysrhythmias (3rd degree heart block), CHF Systolic <55%, DVT current    PE comment: Av paced      Neuro/Psych  (+) headaches, dizziness/light headedness, numbness (bilateral legs and feet), psychiatric history Anxiety and Depression  GI/Hepatic/Renal/Endo    (+) GERD well controlled, renal disease- CRI, thyroid problem hypothyroidism    Musculoskeletal     (+) arthralgias (left knee left wrist), gait problem  (-) neck pain, neck stiffness  Abdominal  - normal exam    Abdomen: soft.   Substance History - negative use     OB/GYN negative ob/gyn ROS         Other   autoimmune disease rheumatoid arthritis, blood dyscrasia (factor V liden),   history of cancer remission                  Anesthesia Plan    ASA 3     general     (Eliquis last 10/22/2024)    Anesthetic plan, risks, benefits, and alternatives have been provided, discussed and informed consent has been obtained with: patient.    Use of blood products discussed with patient .      CODE STATUS:    Code Status (Patient has no pulse and is not breathing): CPR (Attempt to Resuscitate)  Medical Interventions (Patient has pulse or is breathing): Full Support      
no

## 2024-10-23 NOTE — NURSING NOTE
Pts pain well controlled with oxycodone prescription. Pt agreeable to being sent home at this time. Paperwork prepared by this RN. [facility]

## 2024-10-24 ENCOUNTER — TELEPHONE (OUTPATIENT)
Dept: ORTHOPEDIC SURGERY | Facility: CLINIC | Age: 59
End: 2024-10-24
Payer: MEDICARE

## 2024-10-24 ENCOUNTER — READMISSION MANAGEMENT (OUTPATIENT)
Dept: CALL CENTER | Facility: HOSPITAL | Age: 59
End: 2024-10-24
Payer: MEDICARE

## 2024-10-24 LAB
QT INTERVAL: 451 MS
QTC INTERVAL: 454 MS

## 2024-10-24 NOTE — CASE MANAGEMENT/SOCIAL WORK
10/24/24@0800 - Dr Xavier notifies CM that patient was dc'd home yesterday afternoon and he placed an order for HH. Referrals placed to [facility], [facility], [facility] & [facility]. No accepting HH agency available. @1300 - spoke with patient via phone to notify that CM has not been able to arrange HH services. Explained that she can contact her PCP or Ortho group to see if outpatient PT can be ordered if needed. Dr Xavier updated.

## 2024-10-24 NOTE — TELEPHONE ENCOUNTER
PATIENT CALLED TO DISCUSS HH REFERRAL. ADVISED HER THAT THE DOCUMENTATION FROM CASE MANAGEMENT STATED THAT MULTIPLE AGENCIES WERE CALLED TO REQUEST SERVICES WITH NO AGENCY ABLE TO ACCEPT. DISCUSSED OUT PATIENT PT AND LOCATION PREFERENCES. WILL REQUEST OUTPATIENT PHYSICAL THERAPY ORDER BE PLACED BY PROVIDER.

## 2024-10-24 NOTE — OUTREACH NOTE
Prep Survey      Flowsheet Row Responses   [facility] facility patient discharged from? [facility]   Is LACE score < 7 ? No   Eligibility Readm Mgmt   Discharge diagnosis Closed fracture of left distal radius, open reduction and fixation of wrist   Does the patient have one of the following disease processes/diagnoses(primary or secondary)? General Surgery   Does the patient have Home health ordered? No   Is there a DME ordered? No   Medication alerts for this patient see avs   Prep survey completed? Yes            Olimpia STACY - Registered Nurse

## 2024-10-24 NOTE — DISCHARGE PLACEMENT REQUEST
"Power Potter (59 y.o. Female)       Date of Birth   1965    Social Security Number       Address   [address] [place] [address]    Home Phone   [phone]    MRN   [id number]       Religious   [employer]    Marital Status                               Admission Date   10/22/24    Admission Type   Emergency    Admitting Provider   Pierre Xavier MD    Attending Provider       Department, Room/Bed   [facility] MED SURG, 1405/1       Discharge Date   10/23/2024    Discharge Disposition   Home-Health Care [facility]    Discharge Destination                                 Attending Provider: (none)   Allergies: Azithromycin, Erythromycin, Penicillins, Norco [Hydrocodone-acetaminophen], Sulfamethoxazole-trimethoprim    Isolation: None   Infection: None   Code Status: Prior    Ht: 167.6 cm (66\")   Wt: 60.8 kg (134 lb)    Admission Cmt: None   Principal Problem: Closed fracture of left distal radius [S52.502A]                   Active Insurance as of 10/22/2024       Primary Coverage       Payor Plan Insurance Group Employer/Plan Group    ANTH MEDICARE REPLACEMENT ANTH MEDICARE ADVANTAGE [id number]       Payor Plan Address Payor Plan Phone Number Payor Plan Fax Number Effective Dates    [address] [phone]  1/1/2016 - None Entered    [facility] [id number]         Subscriber Name Subscriber Birth Date Member ID       POWER POTTER 1965 [id number]                     Emergency Contacts        (Rel.) Home Phone Work Phone Mobile Phone    TariqConner (Spouse) [phone] -- [phone]    Autumn Velasquez (Mother) [phone] -- [phone]                "

## 2024-10-24 NOTE — CASE MANAGEMENT/SOCIAL WORK
Case Management Discharge Note      Final Note: Discharged home.         Selected Continued Care - Discharged on 10/23/2024 Admission date: 10/22/2024 - Discharge disposition: Home-Health Care Svc      Destination    No services have been selected for the patient.                Durable Medical Equipment    No services have been selected for the patient.                Dialysis/Infusion    No services have been selected for the patient.                Home Medical Care    No services have been selected for the patient.                Therapy    No services have been selected for the patient.                Community Resources    No services have been selected for the patient.                Community & DME    No services have been selected for the patient.                         Final Discharge Disposition Code: 01 - home or self-care

## 2024-10-25 ENCOUNTER — TELEPHONE (OUTPATIENT)
Dept: ORTHOPEDIC SURGERY | Facility: CLINIC | Age: 59
End: 2024-10-25
Payer: MEDICARE

## 2024-10-25 DIAGNOSIS — S52.502A CLOSED FRACTURE OF DISTAL END OF LEFT RADIUS, UNSPECIFIED FRACTURE MORPHOLOGY, INITIAL ENCOUNTER: Primary | ICD-10-CM

## 2024-10-25 RX ORDER — TRAMADOL HYDROCHLORIDE 50 MG/1
50 TABLET ORAL EVERY 8 HOURS PRN
Qty: 30 TABLET | Refills: 0 | Status: SHIPPED | OUTPATIENT
Start: 2024-10-25 | End: 2024-10-28 | Stop reason: SDUPTHER

## 2024-10-25 NOTE — TELEPHONE ENCOUNTER
Left OPEN REDUCTION INTERNAL FIXATION DISTAL RADIUS  10/23/2024    Patient is calling because she cannot tolerate the pain. Patient is currently taking Percocet 7.5-325mg. She is not taking it every 6 hours as prescribed. I cannot tell if she does not want to take it because it makes her sick or because she is afraid of getting addicted to it. She made both comments so I am unsure. I told her that she is more than likely in pain because she is not taking the pain medication as prescribed. Patient wants to try a different pain medication, she cannot take Norco either. She is currently on Eliquis and Gabapentin. Can't take NSAIDS. Please advise.     Patient also has a  patella fracture and is in a knee immobilizer. Patient does not have a post-op appointment scheduled. Do you want her scheduled for next week or the following week?

## 2024-10-28 DIAGNOSIS — S52.502A CLOSED FRACTURE OF DISTAL END OF LEFT RADIUS, UNSPECIFIED FRACTURE MORPHOLOGY, INITIAL ENCOUNTER: ICD-10-CM

## 2024-10-28 RX ORDER — TRAMADOL HYDROCHLORIDE 50 MG/1
50 TABLET ORAL EVERY 8 HOURS PRN
Qty: 30 TABLET | Refills: 0 | Status: SHIPPED | OUTPATIENT
Start: 2024-10-28

## 2024-10-29 ENCOUNTER — READMISSION MANAGEMENT (OUTPATIENT)
Dept: CALL CENTER | Facility: HOSPITAL | Age: 59
End: 2024-10-29
Payer: MEDICARE

## 2024-10-29 NOTE — OUTREACH NOTE
General Surgery Week 1 Survey      Flowsheet Row Responses   [facility] patient discharged from? [place]   Does the patient have one of the following disease processes/diagnoses(primary or secondary)? General Surgery   Week 1 attempt successful? Yes   Call start time 1138   Call end time 1139   Discharge diagnosis Closed fracture of left distal radius, open reduction and fixation of wrist   Meds reviewed with patient/caregiver? Yes   Is the patient having any side effects they believe may be caused by any medication additions or changes? No   Does the patient have all medications related to this admission filled (includes all antibiotics, pain medications, etc.) Yes   Is the patient taking all medications as directed (includes completed medication regime)? Yes   Does the patient have a follow up appointment scheduled with their surgeon? Yes   Has the patient kept scheduled appointments due by today? N/A   Has home health visited the patient within 72 hours of discharge? N/A   Psychosocial issues? No   What is the patient's perception of their health status since discharge? Improving   Week 1 call completed? Yes   Graduated Yes   Wrap up additional comments Conner pts  stated pt is doing great.   Call end time 1139            Coco SHAFFER - Registered Nurse

## 2024-11-12 ENCOUNTER — OFFICE VISIT (OUTPATIENT)
Dept: ORTHOPEDIC SURGERY | Facility: CLINIC | Age: 59
End: 2024-11-12
Payer: MEDICARE

## 2024-11-12 VITALS — BODY MASS INDEX: 21.53 KG/M2 | HEIGHT: 66 IN | WEIGHT: 134 LBS

## 2024-11-12 DIAGNOSIS — M18.12 OSTEOARTHRITIS OF CARPOMETACARPAL (CMC) JOINT OF LEFT THUMB, UNSPECIFIED OSTEOARTHRITIS TYPE: ICD-10-CM

## 2024-11-12 DIAGNOSIS — S52.502A CLOSED FRACTURE OF DISTAL END OF LEFT RADIUS, UNSPECIFIED FRACTURE MORPHOLOGY, INITIAL ENCOUNTER: Primary | ICD-10-CM

## 2024-11-12 PROCEDURE — 99024 POSTOP FOLLOW-UP VISIT: CPT | Performed by: INTERNAL MEDICINE

## 2024-11-12 NOTE — PROGRESS NOTES
Subjective:     Patient ID: Karie Potter is a 59 y.o. female.    Chief Complaint:    History of Present Illness  Karie Potter returns to clinic today for evaluation of left wrist status post ORIF 3 weeks ago on 10/23/2024.  The patient is doing quite well.  She denies any fever chills or drainage from her surgical incision.  She denies any numbness or tingling in her hand.  She states her thumb seems to be less functional than it was prior to her injury.  She states that it bows out bows in and she is not able to extend it quite as much as she used to be able to.     Social History     Occupational History     Employer: RETIRED   Tobacco Use    Smoking status: Every Day     Current packs/day: 1.00     Types: Cigarettes     Passive exposure: Current    Smokeless tobacco: Never   Vaping Use    Vaping status: Never Used   Substance and Sexual Activity    Alcohol use: Yes    Drug use: No    Sexual activity: Defer      Past Medical History:   Diagnosis Date    Abnormal blood chemistry     Allergic rhinitis     Anxiety     Arthritis     Blood clotting disorder     Bronchitis     CAD (coronary artery disease)     CHF (congestive heart failure)     Colon cancer 2012    Complete heart block 1999    [employer] pacemaker, battery changed in 2009    Contusion of left shoulder     COPD (chronic obstructive pulmonary disease)     Crohn's disease     Depression     DVT (deep venous thrombosis) 2005    Dyspareunia, female     Factor V Leiden     Fracture of pubic ramus     Hip fracture     History of prior pregnancies     X2    HIV disease     Hypothyroidism     IBS (irritable bowel syndrome)     Lumbar strain     Migraine     Peripheral neuropathy     Pilonidal cyst     Pulmonary embolus 1998    Rectal cancer 2003    Skin cancer     STD (female)      Past Surgical History:   Procedure Laterality Date    LUNG SURGERY      ORIF WRIST FRACTURE Left 10/23/2024    Procedure: OPEN REDUCTION INTERNAL FIXATION DISTAL RADIUS;  Surgeon:  "Karl Powell MD;  Location: [facility];  Service: Orthopedics;  Laterality: Left;    PACEMAKER IMPLANTATION  2017    PILONIDAL CYSTECTOMY      RECTAL SURGERY      destruction of rectal tumor    THYROIDECTOMY, PARTIAL      TONSILLECTOMY         Family History   Problem Relation Age of Onset    Asthma Mother     Bleeding Disorder Mother     Bleeding Disorder Father     Stroke Father     Hyperlipidemia Father     Heart disease Father     Skin cancer Father     Alcohol abuse Maternal Grandmother     Arthritis Maternal Grandfather     Malig Hyperthermia Neg Hx                  Objective:  Vitals:    24 0902   Weight: 60.8 kg (134 lb)   Height: 167.6 cm (66\")         24  0902   Weight: 60.8 kg (134 lb)     Body mass index is 21.63 kg/m².  BMI is within normal parameters. No other follow-up for BMI required.        Left Hand Exam     Tenderness   The patient is experiencing tenderness in the radial area, dorsal area and palmar area.     Range of Motion   Wrist   Extension:  abnormal   Flexion:  abnormal   Pronation:  abnormal   Supination:  abnormal     Muscle Strength   :  3/5     Other   Erythema: absent  Scars: present  Sensation: normal  Pulse: present             Imagin views of the left wrist were ordered and reviewed by self in the office today  Indication: Left distal radial fracture   Findings: x-rays demonstrate left distal radius fracture status post ORIF with implants in expected position.  There is maintenance of volar tilt radial height and inclination.  There is anatomic alignment of the fracture fragments  Comparative studies: Postop x-rays    Assessment:        1. Closed fracture of distal end of left radius, unspecified fracture morphology, initial encounter           Plan:          Discussed treatment options at length with patient at today's visit. I discussed with the patient at this point in time but I am happy with the progress they have made.  Swelling has improved " significantly and the incision is healing without signs of infection.  Sutures were removed today and Steri-Strips were placed.  The patient will be placed in a short arm Exos brace for the next roughly 4 weeks.  I would like the patient to begin working on thumb and finger flexion and extension as well as forearm pronation and supination.  I will plan to keep the patient nonweightbearing.  The patient may remove the Exos brace 1-2 times per day to bathe and wash their hand.  I would like him to avoid prolonged soaking or submersion of the surgical incision and water until all the scabbing has come off.  I discussed with the patient that if they note any increasing swelling fevers chills or drainage of any kind from the surgical incision to please call the office immediately.  I will plan to see the patient back in 2 to 3 weeks for a 4-week checkup.  Depending upon how they are doing at that time we may refer the patient to occupational therapy.  Discussed with patient that I think her thumb deformity is more of a chronic issue which has been exacerbated by her wrist injury.  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  I recommended referral to hand surgery.  Follow up: 3 weeks with 2 views of the left wrist      Karie Potter was in agreement with plan and had all questions answered.     Medications:  No orders of the defined types were placed in this encounter.      Followup:  No follow-ups on file.    Diagnoses and all orders for this visit:    1. Closed fracture of distal end of left radius, unspecified fracture morphology, initial encounter (Primary)  -     XR Wrist 3+ View Right          Dictated utilizing Dragon dictation

## 2024-11-14 ENCOUNTER — TRANSCRIBE ORDERS (OUTPATIENT)
Age: 59
End: 2024-11-14
Payer: MEDICARE

## 2024-11-14 DIAGNOSIS — Z95.0 PRESENCE OF CARDIAC PACEMAKER: Primary | ICD-10-CM

## 2024-12-10 ENCOUNTER — OFFICE VISIT (OUTPATIENT)
Dept: ORTHOPEDIC SURGERY | Facility: CLINIC | Age: 59
End: 2024-12-10
Payer: MEDICARE

## 2024-12-10 VITALS — WEIGHT: 134 LBS | HEIGHT: 66 IN | BODY MASS INDEX: 21.53 KG/M2

## 2024-12-10 DIAGNOSIS — S52.502A CLOSED FRACTURE OF DISTAL END OF LEFT RADIUS, UNSPECIFIED FRACTURE MORPHOLOGY, INITIAL ENCOUNTER: Primary | ICD-10-CM

## 2024-12-10 PROCEDURE — 99024 POSTOP FOLLOW-UP VISIT: CPT | Performed by: INTERNAL MEDICINE

## 2024-12-10 PROCEDURE — 73100 X-RAY EXAM OF WRIST: CPT | Performed by: INTERNAL MEDICINE

## 2024-12-10 RX ORDER — CABOTEGRAVIR AND RILPIVIRINE 600-900/3
6 KIT INTRAMUSCULAR
COMMUNITY
Start: 2024-12-09

## 2024-12-10 RX ORDER — BUTALBITAL, ACETAMINOPHEN AND CAFFEINE 50; 325; 40 MG/1; MG/1; MG/1
TABLET ORAL
COMMUNITY
Start: 2024-10-02

## 2024-12-10 NOTE — PROGRESS NOTES
Subjective:     Patient ID: Karie Potter is a 59 y.o. female.    Chief Complaint:    History of Present Illness  Karie Potter returns to clinic today for evaluation of right wrist status post ORIF 7 weeks ago on 10/23/2024.  The patient is doing better.  She denies any fevers chills or drainage from her surgical incision or any numbness or tingling in her hand.  She has been immobilized in the Exos brace.  She states she never went to physical therapy or occupational therapy.  She did go see Dr. Toshia Toure for her CMC arthritis and was given a brace which she discontinued.  She was also given an injection.  She states that the thumb is feeling better and she has been doing therapy on her own and the pain has improved tremendously.     Social History     Occupational History     Employer: RETIRED   Tobacco Use    Smoking status: Every Day     Current packs/day: 1.00     Types: Cigarettes     Passive exposure: Current    Smokeless tobacco: Never   Vaping Use    Vaping status: Never Used   Substance and Sexual Activity    Alcohol use: Yes    Drug use: No    Sexual activity: Defer      Past Medical History:   Diagnosis Date    Abnormal blood chemistry     Allergic rhinitis     Anxiety     Arthritis     Blood clotting disorder     Bronchitis     CAD (coronary artery disease)     CHF (congestive heart failure)     Colon cancer 2012    Complete heart block 1999    [employer] pacemaker, battery changed in 2009    Contusion of left shoulder     COPD (chronic obstructive pulmonary disease)     Crohn's disease     Depression     DVT (deep venous thrombosis) 2005    Dyspareunia, female     Factor V Leiden     Fracture of pubic ramus     Hip fracture     History of prior pregnancies     X2    HIV disease     Hypothyroidism     IBS (irritable bowel syndrome)     Lumbar strain     Migraine     Peripheral neuropathy     Pilonidal cyst     Pulmonary embolus 1998    Rectal cancer 2003    Skin cancer     STD (female)      Past  "Surgical History:   Procedure Laterality Date    LUNG SURGERY      ORIF WRIST FRACTURE Left 10/23/2024    Procedure: OPEN REDUCTION INTERNAL FIXATION DISTAL RADIUS;  Surgeon: Karl Powell MD;  Location: [facility];  Service: Orthopedics;  Laterality: Left;    PACEMAKER IMPLANTATION  2017    PILONIDAL CYSTECTOMY      RECTAL SURGERY      destruction of rectal tumor    THYROIDECTOMY, PARTIAL      TONSILLECTOMY         Family History   Problem Relation Age of Onset    Asthma Mother     Bleeding Disorder Mother     Bleeding Disorder Father     Stroke Father     Hyperlipidemia Father     Heart disease Father     Skin cancer Father     Alcohol abuse Maternal Grandmother     Arthritis Maternal Grandfather     Malig Hyperthermia Neg Hx                  Objective:  Vitals:    12/10/24 0912   Weight: 60.8 kg (134 lb)   Height: 167.6 cm (66\")         12/10/24  0912   Weight: 60.8 kg (134 lb)     Body mass index is 21.63 kg/m².  BMI is within normal parameters. No other follow-up for BMI required.        Left Hand Exam     Tenderness   Left hand tenderness location: 1st cmc.     Range of Motion   Wrist   Extension:  normal   Flexion:  normal   Pronation:  normal   Supination:  normal     Muscle Strength   :  4/5     Other   Erythema: absent  Scars: present  Sensation: normal  Pulse: present               Imagin views of the right distal radius were ordered and reviewed by myself in the office today  Indication: Right wrist fracture  Findings: X-rays demonstrate a right distal radial fracture status post ORIF with implants in expected position.  No signs of loss of reduction or failure fixation.  Fracture is healed. Severe 1st cmc OA  Comparative studies: None    Assessment:        1. Closed fracture of distal end of left radius, unspecified fracture morphology, initial encounter           Plan:          Discussed treatment options at length with patient at today's visit. I discussed with the patient at this point in " time but I am happy with the progress they have made.  Swelling has improved significantly and the incision is healing without signs of infection.  Incision is healed  The patient discontinue the Exos brace on her own and did not go to occupational therapy but has been doing exercises on her own..  I would like the patient to continue working on thumb and finger flexion and extension as well as forearm pronation and supination.  I discussed with the patient that if they note any increasing swelling fevers chills or drainage of any kind from the surgical incision to please call the office immediately.  I discussed with her that as long as she continues to do well I do not need to see her back for further follow-up.  I discussed with her that if her first CMC begins to bother her again to get back into see the hand surgeon..    Follow up: As needed      Karie Potter was in agreement with plan and had all questions answered.     Medications:  No orders of the defined types were placed in this encounter.      Followup:  No follow-ups on file.    Diagnoses and all orders for this visit:    1. Closed fracture of distal end of left radius, unspecified fracture morphology, initial encounter (Primary)  -     XR Wrist 2 View Left          Dictated utilizing Dragon dictation

## 2025-01-17 ENCOUNTER — OFFICE VISIT (OUTPATIENT)
Dept: CARDIOLOGY | Facility: CLINIC | Age: 60
End: 2025-01-17
Payer: MEDICARE

## 2025-01-17 ENCOUNTER — CLINICAL SUPPORT NO REQUIREMENTS (OUTPATIENT)
Age: 60
End: 2025-01-17
Payer: MEDICARE

## 2025-01-17 VITALS
WEIGHT: 144 LBS | BODY MASS INDEX: 23.14 KG/M2 | DIASTOLIC BLOOD PRESSURE: 78 MMHG | HEIGHT: 66 IN | SYSTOLIC BLOOD PRESSURE: 130 MMHG | HEART RATE: 63 BPM

## 2025-01-17 DIAGNOSIS — R06.02 SHORT OF BREATH ON EXERTION: Primary | ICD-10-CM

## 2025-01-17 DIAGNOSIS — Z95.0 PRESENCE OF CARDIAC PACEMAKER: ICD-10-CM

## 2025-01-17 PROCEDURE — 99204 OFFICE O/P NEW MOD 45 MIN: CPT | Performed by: INTERNAL MEDICINE

## 2025-01-17 PROCEDURE — 93000 ELECTROCARDIOGRAM COMPLETE: CPT | Performed by: INTERNAL MEDICINE

## 2025-01-17 NOTE — PROGRESS NOTES
"      CARDIOLOGY    Berhane Butler MD    ENCOUNTER DATE:  01/17/2025    Karie Potter / 59 y.o. / female        CHIEF COMPLAINT / REASON FOR OFFICE VISIT     pacemaker and Follow-up      HISTORY OF PRESENT ILLNESS       HPI  Karie Potter is a 59 y.o. female who presents today for consultation.  Patient has a quite extensive past medical history.  Patient has a Leyden factor V deficiency she has a pulmonary embolism she is on chronic anticoagulation.  She has a history of HIV which was diagnosed 30 some years ago.  She also has a pacemaker that was placed for complete heart block.  Clinically she is doing great with no complaints except for occasional some shortness of breath.  Relative however to all of her health issues she looks great she says overall she feels good.      The following portions of the patient's history were reviewed and updated as appropriate: allergies, current medications, past family history, past medical history, past social history, past surgical history and problem list.      VITAL SIGNS     Visit Vitals  /78 (BP Location: Left arm, Patient Position: Sitting, Cuff Size: Adult)   Pulse 63   Ht 167.6 cm (66\")   Wt 65.3 kg (144 lb)   BMI 23.24 kg/m²         Wt Readings from Last 3 Encounters:   01/17/25 65.3 kg (144 lb)   12/10/24 60.8 kg (134 lb)   11/12/24 60.8 kg (134 lb)     Body mass index is 23.24 kg/m².      REVIEW OF SYSTEMS   ROS        PHYSICAL EXAMINATION     Vitals reviewed.   Constitutional:       Appearance: Healthy appearance.   Pulmonary:      Effort: Pulmonary effort is normal.   Cardiovascular:      Normal rate. Regular rhythm. Normal S1. Normal S2.       Murmurs: There is no murmur.      No gallop.  No click. No rub.   Pulses:     Intact distal pulses.   Edema:     Peripheral edema absent.   Neurological:      Mental Status: Alert.           REVIEWED DATA       ECG 12 Lead    Date/Time: 1/17/2025 3:13 PM  Performed by: Berhane Butler MD    Authorized by: " Berhane Butler MD  Comparison: compared with previous ECG from 10/23/2024  Similar to previous ECG  Pacing: dual chamber pacing  Clinical impression: abnormal EKG          Cardiac Procedures:            ASSESSMENT & PLAN      Diagnosis Plan   1. Short of breath on exertion  Adult Transthoracic Echo Complete W/ Cont if Necessary Per Protocol            SUMMARY/DISCUSSION  History of permanent pacemaker for complete heart block.  Patient was followed by Dr. Simon in the past for regular changed over to our pacemaker clinic.  Patient had initial pacemaker placed in her 30s.  History of HIV.  Last echocardiogram was many many years ago I will repeat her echocardiogram.  She just occasionally gets short of breath.  Patient was on many HIV drugs over the years she denies any types of chest discomfort or exercise intolerance.  ECG just shows paced rhythm.  History of pulmonary embolism.  She also has a factor V deficiency she is on chronic anticoagulation.  If her echo is unremarkable we will reevaluate in 6 months we will follow-up with her pacemaker and go from there.        MEDICATIONS         Discharge Medications            Accurate as of January 17, 2025  3:11 PM. If you have any questions, ask your nurse or doctor.                Continue These Medications        Instructions Start Date   apixaban 5 MG tablet tablet  Commonly known as: ELIQUIS   5 mg, Every 12 Hours Scheduled      butalbital-acetaminophen-caffeine -40 MG per tablet  Commonly known as: FIORICET, ESGIC       Cabenuva 600 & 900 MG/3ML Suspension Extended Release  Generic drug: Cabotegravir & Rilpivirine ER   6 mL      gabapentin 300 MG capsule  Commonly known as: NEURONTIN   300 mg, 2 Times Daily      loperamide 2 MG capsule  Commonly known as: IMODIUM   8 mg, Daily      multivitamin with minerals tablet tablet   1 tablet, Daily      ondansetron 4 MG tablet  Commonly known as: Zofran   4 mg, Oral, Every 8 Hours PRN       oxyCODONE-acetaminophen 7.5-325 MG per tablet  Commonly known as: Percocet   1 tablet, Oral, Every 6 Hours PRN      topiramate 50 MG tablet  Commonly known as: TOPAMAX   50 mg, 2 Times Daily      traMADol 50 MG tablet  Commonly known as: ULTRAM   50 mg, Oral, Every 8 Hours PRN      Wellbutrin  MG 24 hr tablet  Generic drug: buPROPion XL   300 mg, Daily                   **Dragon Disclaimer:   Much of this encounter note is an electronic transcription/translation of spoken language to printed text. The electronic translation of spoken language may permit erroneous, or at times, nonsensical words or phrases to be inadvertently transcribed. Although I have reviewed the note for such errors, some may still exist.

## 2025-02-07 ENCOUNTER — HOSPITAL ENCOUNTER (OUTPATIENT)
Dept: CARDIOLOGY | Facility: HOSPITAL | Age: 60
Discharge: HOME | End: 2025-02-07
Payer: MEDICARE

## 2025-02-07 DIAGNOSIS — R06.02 SHORT OF BREATH ON EXERTION: ICD-10-CM

## 2025-02-07 LAB
AORTIC ARCH: 1.9 CM
ASCENDING AORTA: 2.5 CM
AV MEAN PRESS GRAD SYS DOP V1V2: 6 MMHG
AV VMAX SYS DOP: 163 CM/SEC
BH CV ECHO LEFT ATRIAL STRAIN: 13.5 %
BH CV ECHO MEAS - ACS: 1.58 CM
BH CV ECHO MEAS - AO MAX PG: 10.6 MMHG
BH CV ECHO MEAS - AO ROOT DIAM: 3.3 CM
BH CV ECHO MEAS - AO V2 VTI: 35.1 CM
BH CV ECHO MEAS - AVA(I,D): 1.73 CM2
BH CV ECHO MEAS - EDV(CUBED): 97.3 ML
BH CV ECHO MEAS - EDV(MOD-SP2): 70 ML
BH CV ECHO MEAS - EDV(MOD-SP4): 62 ML
BH CV ECHO MEAS - EF(MOD-SP2): 60 %
BH CV ECHO MEAS - EF(MOD-SP4): 58.1 %
BH CV ECHO MEAS - ESV(CUBED): 18 ML
BH CV ECHO MEAS - ESV(MOD-SP2): 28 ML
BH CV ECHO MEAS - ESV(MOD-SP4): 26 ML
BH CV ECHO MEAS - FS: 43.1 %
BH CV ECHO MEAS - IVS/LVPW: 1 CM
BH CV ECHO MEAS - IVSD: 1 CM
BH CV ECHO MEAS - LAT PEAK E' VEL: 7.5 CM/SEC
BH CV ECHO MEAS - LV DIASTOLIC VOL/BSA (35-75): 35.6 CM2
BH CV ECHO MEAS - LV MASS(C)D: 158.8 GRAMS
BH CV ECHO MEAS - LV MAX PG: 3.3 MMHG
BH CV ECHO MEAS - LV MEAN PG: 2 MMHG
BH CV ECHO MEAS - LV SYSTOLIC VOL/BSA (12-30): 14.9 CM2
BH CV ECHO MEAS - LV V1 MAX: 91.3 CM/SEC
BH CV ECHO MEAS - LV V1 VTI: 19 CM
BH CV ECHO MEAS - LVIDD: 4.6 CM
BH CV ECHO MEAS - LVIDS: 2.6 CM
BH CV ECHO MEAS - LVOT AREA: 3.2 CM2
BH CV ECHO MEAS - LVOT DIAM: 2.02 CM
BH CV ECHO MEAS - LVPWD: 1 CM
BH CV ECHO MEAS - MED PEAK E' VEL: 4.4 CM/SEC
BH CV ECHO MEAS - MR MAX PG: 18.4 MMHG
BH CV ECHO MEAS - MR MAX VEL: 214.3 CM/SEC
BH CV ECHO MEAS - MV A DUR: 0.12 SEC
BH CV ECHO MEAS - MV A MAX VEL: 81.8 CM/SEC
BH CV ECHO MEAS - MV DEC SLOPE: 268.9 CM/SEC2
BH CV ECHO MEAS - MV DEC TIME: 0.15 SEC
BH CV ECHO MEAS - MV E MAX VEL: 42.8 CM/SEC
BH CV ECHO MEAS - MV E/A: 0.52
BH CV ECHO MEAS - MV MAX PG: 3.4 MMHG
BH CV ECHO MEAS - MV MEAN PG: 0.93 MMHG
BH CV ECHO MEAS - MV P1/2T: 71.5 MSEC
BH CV ECHO MEAS - MV V2 VTI: 20.3 CM
BH CV ECHO MEAS - MVA(P1/2T): 3.1 CM2
BH CV ECHO MEAS - MVA(VTI): 3 CM2
BH CV ECHO MEAS - PA ACC TIME: 0.16 SEC
BH CV ECHO MEAS - PA V2 MAX: 82.4 CM/SEC
BH CV ECHO MEAS - PULM A REVS DUR: 0.1 SEC
BH CV ECHO MEAS - PULM A REVS VEL: 31.3 CM/SEC
BH CV ECHO MEAS - PULM DIAS VEL: 50.1 CM/SEC
BH CV ECHO MEAS - PULM S/D: 0.84
BH CV ECHO MEAS - PULM SYS VEL: 42 CM/SEC
BH CV ECHO MEAS - QP/QS: 0.65
BH CV ECHO MEAS - RAP SYSTOLE: 3 MMHG
BH CV ECHO MEAS - RV MAX PG: 1.57 MMHG
BH CV ECHO MEAS - RV V1 MAX: 62.6 CM/SEC
BH CV ECHO MEAS - RV V1 VTI: 12.2 CM
BH CV ECHO MEAS - RVOT DIAM: 2.04 CM
BH CV ECHO MEAS - RVSP: 30 MMHG
BH CV ECHO MEAS - SUP REN AO DIAM: 2.2 CM
BH CV ECHO MEAS - SV(LVOT): 60.9 ML
BH CV ECHO MEAS - SV(MOD-SP2): 42 ML
BH CV ECHO MEAS - SV(MOD-SP4): 36 ML
BH CV ECHO MEAS - SV(RVOT): 39.8 ML
BH CV ECHO MEAS - SVI(LVOT): 35 ML/M2
BH CV ECHO MEAS - SVI(MOD-SP2): 24.1 ML/M2
BH CV ECHO MEAS - SVI(MOD-SP4): 20.7 ML/M2
BH CV ECHO MEAS - TAPSE (>1.6): 2.18 CM
BH CV ECHO MEAS - TR MAX PG: 27 MMHG
BH CV ECHO MEAS - TR MAX VEL: 259.9 CM/SEC
BH CV ECHO MEASUREMENTS AVERAGE E/E' RATIO: 7.19
BH CV XLRA - RV BASE: 3.9 CM
BH CV XLRA - RV LENGTH: 5.4 CM
BH CV XLRA - RV MID: 3.9 CM
BH CV XLRA - TDI S': 15.2 CM/SEC
LEFT ATRIUM VOLUME INDEX: 31.1 ML/M2
LV EF BIPLANE MOD: 58.9 %
SINUS: 3 CM
STJ: 2.28 CM

## 2025-02-07 PROCEDURE — 93306 TTE W/DOPPLER COMPLETE: CPT

## 2025-08-06 LAB
MC_CV_MDC_IDC_RATE_1: 180
MC_CV_MDC_IDC_ZONE_ID: 2
MDC_IDC_MSMT_BATTERY_REMAINING_LONGEVITY: 27 MO
MDC_IDC_MSMT_BATTERY_STATUS: NORMAL
MDC_IDC_MSMT_BATTERY_VOLTAGE: 2.75
MDC_IDC_MSMT_LEADCHNL_RA_DTM: NORMAL
MDC_IDC_MSMT_LEADCHNL_RA_IMPEDANCE_VALUE: 578
MDC_IDC_MSMT_LEADCHNL_RA_PACING_THRESHOLD_AMPLITUDE: 0.88
MDC_IDC_MSMT_LEADCHNL_RA_PACING_THRESHOLD_PULSEWIDTH: 0.4
MDC_IDC_MSMT_LEADCHNL_RV_DTM: NORMAL
MDC_IDC_MSMT_LEADCHNL_RV_IMPEDANCE_VALUE: 516
MDC_IDC_MSMT_LEADCHNL_RV_PACING_THRESHOLD_AMPLITUDE: 0.75
MDC_IDC_MSMT_LEADCHNL_RV_PACING_THRESHOLD_PULSEWIDTH: 0.4
MDC_IDC_PG_MFG: NORMAL
MDC_IDC_PG_MODEL: NORMAL
MDC_IDC_PG_SERIAL: NORMAL
MDC_IDC_PG_TYPE: NORMAL
MDC_IDC_SESS_DTM: NORMAL
MDC_IDC_SESS_TYPE: NORMAL
MDC_IDC_SET_BRADY_LOWRATE: 60
MDC_IDC_SET_BRADY_MAX_SENSOR_RATE: 130
MDC_IDC_SET_BRADY_MAX_TRACKING_RATE: 130
MDC_IDC_SET_BRADY_MODE: NORMAL
MDC_IDC_SET_BRADY_PAV_DELAY: 150
MDC_IDC_SET_BRADY_SAV_DELAY: 120
MDC_IDC_SET_LEADCHNL_RA_PACING_AMPLITUDE: 1.75
MDC_IDC_SET_LEADCHNL_RA_PACING_POLARITY: NORMAL
MDC_IDC_SET_LEADCHNL_RA_PACING_PULSEWIDTH: 0.4
MDC_IDC_SET_LEADCHNL_RA_SENSING_POLARITY: NORMAL
MDC_IDC_SET_LEADCHNL_RA_SENSING_SENSITIVITY: 0.5
MDC_IDC_SET_LEADCHNL_RV_PACING_AMPLITUDE: 2
MDC_IDC_SET_LEADCHNL_RV_PACING_POLARITY: NORMAL
MDC_IDC_SET_LEADCHNL_RV_PACING_PULSEWIDTH: 0.4
MDC_IDC_SET_LEADCHNL_RV_SENSING_POLARITY: NORMAL
MDC_IDC_SET_LEADCHNL_RV_SENSING_SENSITIVITY: 2.8
MDC_IDC_SET_ZONE_STATUS: NORMAL
MDC_IDC_SET_ZONE_TYPE: NORMAL
MDC_IDC_STAT_AT_BURDEN_PERCENT: 0
MDC_IDC_STAT_BRADY_RA_PERCENT_PACED: 62
MDC_IDC_STAT_BRADY_RV_PERCENT_PACED: 100

## 2025-08-22 LAB
MC_CV_MDC_IDC_RATE_1: 180
MC_CV_MDC_IDC_ZONE_ID: 2
MDC_IDC_MSMT_BATTERY_REMAINING_LONGEVITY: 27 MO
MDC_IDC_MSMT_BATTERY_STATUS: NORMAL
MDC_IDC_MSMT_BATTERY_VOLTAGE: 2.75
MDC_IDC_MSMT_LEADCHNL_RA_DTM: NORMAL
MDC_IDC_MSMT_LEADCHNL_RA_IMPEDANCE_VALUE: 578
MDC_IDC_MSMT_LEADCHNL_RA_PACING_THRESHOLD_AMPLITUDE: 0.88
MDC_IDC_MSMT_LEADCHNL_RA_PACING_THRESHOLD_PULSEWIDTH: 0.4
MDC_IDC_MSMT_LEADCHNL_RV_DTM: NORMAL
MDC_IDC_MSMT_LEADCHNL_RV_IMPEDANCE_VALUE: 516
MDC_IDC_MSMT_LEADCHNL_RV_PACING_THRESHOLD_AMPLITUDE: 0.75
MDC_IDC_MSMT_LEADCHNL_RV_PACING_THRESHOLD_PULSEWIDTH: 0.4
MDC_IDC_PG_MFG: NORMAL
MDC_IDC_PG_MODEL: NORMAL
MDC_IDC_PG_SERIAL: NORMAL
MDC_IDC_PG_TYPE: NORMAL
MDC_IDC_SESS_DTM: NORMAL
MDC_IDC_SESS_TYPE: NORMAL
MDC_IDC_SET_BRADY_LOWRATE: 60
MDC_IDC_SET_BRADY_MAX_SENSOR_RATE: 130
MDC_IDC_SET_BRADY_MAX_TRACKING_RATE: 130
MDC_IDC_SET_BRADY_MODE: NORMAL
MDC_IDC_SET_BRADY_PAV_DELAY: 150
MDC_IDC_SET_BRADY_SAV_DELAY: 120
MDC_IDC_SET_LEADCHNL_RA_PACING_AMPLITUDE: 1.75
MDC_IDC_SET_LEADCHNL_RA_PACING_POLARITY: NORMAL
MDC_IDC_SET_LEADCHNL_RA_PACING_PULSEWIDTH: 0.4
MDC_IDC_SET_LEADCHNL_RA_SENSING_POLARITY: NORMAL
MDC_IDC_SET_LEADCHNL_RA_SENSING_SENSITIVITY: 0.5
MDC_IDC_SET_LEADCHNL_RV_PACING_AMPLITUDE: 2
MDC_IDC_SET_LEADCHNL_RV_PACING_POLARITY: NORMAL
MDC_IDC_SET_LEADCHNL_RV_PACING_PULSEWIDTH: 0.4
MDC_IDC_SET_LEADCHNL_RV_SENSING_POLARITY: NORMAL
MDC_IDC_SET_LEADCHNL_RV_SENSING_SENSITIVITY: 2.8
MDC_IDC_SET_ZONE_STATUS: NORMAL
MDC_IDC_SET_ZONE_TYPE: NORMAL
MDC_IDC_STAT_AT_BURDEN_PERCENT: 0
MDC_IDC_STAT_BRADY_RA_PERCENT_PACED: 62
MDC_IDC_STAT_BRADY_RV_PERCENT_PACED: 100

## (undated) DEVICE — PATIENT RETURN ELECTRODE, SINGLE-USE, CONTACT QUALITY MONITORING, ADULT, WITH 9FT CORD, FOR PATIENTS WEIGING OVER 33LBS. (15KG): Brand: MEGADYNE

## (undated) DEVICE — SPNG GZ WOVN 4X4IN 12PLY 10/BX STRL

## (undated) DEVICE — DRVR QC UNIV T10

## (undated) DEVICE — DRVR AO CONNECT SQ TP 2MM

## (undated) DEVICE — BNDG,ELSTC,MATRIX,STRL,3"X5YD,LF,HOOK&LP: Brand: MEDLINE

## (undated) DEVICE — BIT DRL SLD SD CUT 2.5X40MM

## (undated) DEVICE — GLV SURG SENSICARE PI LF PF 8 GRN STRL

## (undated) DEVICE — CVR C/ARM MINI

## (undated) DEVICE — DRP SURG U/DRP INVISISHIELD PA 48X52IN

## (undated) DEVICE — BNDG,ELSTC,MATRIX,STRL,4"X5YD,LF,HOOK&LP: Brand: MEDLINE

## (undated) DEVICE — SOL ISO/ALC RUB 70PCT 4OZ

## (undated) DEVICE — GLV SURG SENSICARE PI LF PF 7.5

## (undated) DEVICE — SUT MNCRYL 2/0 SH 27IN Y417H

## (undated) DEVICE — ARM SLING II: Brand: DEROYAL

## (undated) DEVICE — SOL IRR H2O BTL 1000ML STRL

## (undated) DEVICE — DRSNG GZ CURAD XEROFORM PETROLTM OVERWRP 1X8IN STRL

## (undated) DEVICE — PCH INST SURG INVISISHIELD 2PCKT

## (undated) DEVICE — FRAZIER SUCTION INSTRUMENT 10 FR W/CONTROL VENT & OBTURATOR: Brand: FRAZIER

## (undated) DEVICE — SPLNT SCOTCHCAST/QUICKSTEP DBL/SD/FELT FIBRGLS 3X12IN WHT

## (undated) DEVICE — GUIDE AIMING 1.5MM

## (undated) DEVICE — KWIRE STD TP 1.6X127MM
Type: IMPLANTABLE DEVICE | Site: WRIST | Status: NON-FUNCTIONAL
Removed: 2024-10-23

## (undated) DEVICE — INTENDED FOR TISSUE SEPARATION, AND OTHER PROCEDURES THAT REQUIRE A SHARP SURGICAL BLADE TO PUNCTURE OR CUT.: Brand: BARD-PARKER ® STAINLESS STEEL BLADES

## (undated) DEVICE — BANDAGE,ELASTIC,ESMARK,STERILE,4"X9',LF: Brand: MEDLINE

## (undated) DEVICE — Device

## (undated) DEVICE — WEBRIL* CAST PADDING: Brand: DEROYAL

## (undated) DEVICE — BIT DRL SLD SD CUT 2.0X40MM

## (undated) DEVICE — DRSNG PAD ABD 8X10IN STRL

## (undated) DEVICE — PADDING,UNDERCAST,COTTON, 4"X4YD STERILE: Brand: MEDLINE

## (undated) DEVICE — PK BASIC ORTHO 90